# Patient Record
Sex: FEMALE | Race: WHITE | Employment: OTHER | URBAN - METROPOLITAN AREA
[De-identification: names, ages, dates, MRNs, and addresses within clinical notes are randomized per-mention and may not be internally consistent; named-entity substitution may affect disease eponyms.]

---

## 2017-02-28 ENCOUNTER — TELEPHONE (OUTPATIENT)
Dept: INTERNAL MEDICINE | Age: 62
End: 2017-02-28

## 2017-03-09 ENCOUNTER — TELEPHONE (OUTPATIENT)
Dept: INTERNAL MEDICINE | Age: 62
End: 2017-03-09

## 2017-03-09 DIAGNOSIS — G44.221 CHRONIC TENSION-TYPE HEADACHE, INTRACTABLE: Primary | ICD-10-CM

## 2017-03-31 ENCOUNTER — TELEPHONE (OUTPATIENT)
Dept: INTERNAL MEDICINE | Age: 62
End: 2017-03-31

## 2017-03-31 DIAGNOSIS — Z00.00 ROUTINE GENERAL MEDICAL EXAMINATION AT A HEALTH CARE FACILITY: Primary | ICD-10-CM

## 2017-03-31 DIAGNOSIS — G44.221 CHRONIC TENSION-TYPE HEADACHE, INTRACTABLE: ICD-10-CM

## 2017-04-06 ENCOUNTER — TELEPHONE (OUTPATIENT)
Dept: INTERNAL MEDICINE | Age: 62
End: 2017-04-06

## 2017-05-02 ENCOUNTER — OFFICE VISIT (OUTPATIENT)
Dept: INTERNAL MEDICINE | Age: 62
End: 2017-05-02

## 2017-05-02 VITALS
DIASTOLIC BLOOD PRESSURE: 66 MMHG | SYSTOLIC BLOOD PRESSURE: 106 MMHG | HEIGHT: 64 IN | BODY MASS INDEX: 20.49 KG/M2 | WEIGHT: 120 LBS

## 2017-05-02 DIAGNOSIS — M72.2 PLANTAR FASCIA SYNDROME: ICD-10-CM

## 2017-05-02 DIAGNOSIS — F51.01 PRIMARY INSOMNIA: ICD-10-CM

## 2017-05-02 DIAGNOSIS — F41.9 ANXIETY: ICD-10-CM

## 2017-05-02 DIAGNOSIS — K64.9 HEMORRHOIDS, UNSPECIFIED HEMORRHOID TYPE: ICD-10-CM

## 2017-05-02 PROCEDURE — 99214 OFFICE O/P EST MOD 30 MIN: CPT | Performed by: INTERNAL MEDICINE

## 2017-05-02 ASSESSMENT — ENCOUNTER SYMPTOMS
RHINORRHEA: 1
CHEST TIGHTNESS: 0
ABDOMINAL PAIN: 0
WHEEZING: 0
COLOR CHANGE: 0
BACK PAIN: 0

## 2017-05-04 PROBLEM — M72.2 PLANTAR FASCIA SYNDROME: Status: ACTIVE | Noted: 2017-05-04

## 2017-05-04 PROBLEM — K64.9 HEMORRHOIDS: Status: ACTIVE | Noted: 2017-05-04

## 2017-05-04 ASSESSMENT — ENCOUNTER SYMPTOMS
BLOOD IN STOOL: 1
RECTAL PAIN: 1

## 2017-05-26 ENCOUNTER — TELEPHONE (OUTPATIENT)
Dept: INTERNAL MEDICINE | Age: 62
End: 2017-05-26

## 2017-09-14 ENCOUNTER — HOSPITAL ENCOUNTER (OUTPATIENT)
Dept: OTHER | Age: 62
Discharge: HOME | End: 2017-09-14
Attending: INTERNAL MEDICINE | Admitting: INTERNAL MEDICINE

## 2017-09-14 ENCOUNTER — OFFICE VISIT (OUTPATIENT)
Dept: INTERNAL MEDICINE | Age: 62
End: 2017-09-14

## 2017-09-14 ENCOUNTER — TELEPHONE (OUTPATIENT)
Dept: INTERNAL MEDICINE | Age: 62
End: 2017-09-14

## 2017-09-14 VITALS
DIASTOLIC BLOOD PRESSURE: 66 MMHG | HEIGHT: 64 IN | BODY MASS INDEX: 21.17 KG/M2 | SYSTOLIC BLOOD PRESSURE: 106 MMHG | WEIGHT: 124 LBS

## 2017-09-14 DIAGNOSIS — Z00.00 WELL ADULT EXAM: ICD-10-CM

## 2017-09-14 DIAGNOSIS — H81.10 BPV (BENIGN POSITIONAL VERTIGO), UNSPECIFIED LATERALITY: ICD-10-CM

## 2017-09-14 DIAGNOSIS — R42 DIZZINESS: Primary | ICD-10-CM

## 2017-09-14 DIAGNOSIS — F41.9 ANXIETY: ICD-10-CM

## 2017-09-14 DIAGNOSIS — M54.2 NECK PAIN: ICD-10-CM

## 2017-09-14 DIAGNOSIS — G44.221 CHRONIC TENSION-TYPE HEADACHE, INTRACTABLE: ICD-10-CM

## 2017-09-14 DIAGNOSIS — F51.01 PRIMARY INSOMNIA: ICD-10-CM

## 2017-09-14 PROCEDURE — 99214 OFFICE O/P EST MOD 30 MIN: CPT | Performed by: INTERNAL MEDICINE

## 2017-09-14 ASSESSMENT — PATIENT HEALTH QUESTIONNAIRE - PHQ9
SUM OF ALL RESPONSES TO PHQ9 QUESTIONS 1 & 2: 0
SUM OF ALL RESPONSES TO PHQ QUESTIONS 1-9: 0
2. FEELING DOWN, DEPRESSED OR HOPELESS: 0
1. LITTLE INTEREST OR PLEASURE IN DOING THINGS: 0

## 2017-09-25 ASSESSMENT — ENCOUNTER SYMPTOMS
CHEST TIGHTNESS: 0
WHEEZING: 0
BACK PAIN: 0
ABDOMINAL PAIN: 0
COLOR CHANGE: 0

## 2017-12-13 ENCOUNTER — HOSPITAL ENCOUNTER (OUTPATIENT)
Dept: MAMMOGRAPHY | Age: 62
Discharge: HOME | End: 2017-12-13
Attending: OBSTETRICS & GYNECOLOGY | Admitting: OBSTETRICS & GYNECOLOGY

## 2017-12-13 DIAGNOSIS — Z12.31 VISIT FOR SCREENING MAMMOGRAM: ICD-10-CM

## 2018-01-11 ENCOUNTER — OFFICE VISIT (OUTPATIENT)
Dept: INTERNAL MEDICINE | Age: 63
End: 2018-01-11

## 2018-01-11 VITALS
DIASTOLIC BLOOD PRESSURE: 80 MMHG | WEIGHT: 125 LBS | BODY MASS INDEX: 21.34 KG/M2 | SYSTOLIC BLOOD PRESSURE: 116 MMHG | HEIGHT: 64 IN

## 2018-01-11 DIAGNOSIS — J01.41 ACUTE RECURRENT PANSINUSITIS: ICD-10-CM

## 2018-01-11 DIAGNOSIS — F41.9 ANXIETY: ICD-10-CM

## 2018-01-11 DIAGNOSIS — I34.1 MVP (MITRAL VALVE PROLAPSE): ICD-10-CM

## 2018-01-11 DIAGNOSIS — G44.221 CHRONIC TENSION-TYPE HEADACHE, INTRACTABLE: Primary | ICD-10-CM

## 2018-01-11 DIAGNOSIS — R00.2 PALPITATIONS: ICD-10-CM

## 2018-01-11 PROCEDURE — 99214 OFFICE O/P EST MOD 30 MIN: CPT | Performed by: INTERNAL MEDICINE

## 2018-01-11 RX ORDER — BUTALBITAL, ACETAMINOPHEN AND CAFFEINE 50; 325; 40 MG/1; MG/1; MG/1
1 TABLET ORAL EVERY 4 HOURS PRN
Qty: 30 TABLET | Refills: 1 | Status: SHIPPED | OUTPATIENT
Start: 2018-01-11 | End: 2019-07-08

## 2018-01-11 ASSESSMENT — ENCOUNTER SYMPTOMS
SINUS PRESSURE: 1
CHEST TIGHTNESS: 0
COUGH: 0
BACK PAIN: 0
COLOR CHANGE: 0
ABDOMINAL PAIN: 0
SINUS PAIN: 1
WHEEZING: 0
EYE PAIN: 0
EYE DISCHARGE: 0

## 2018-01-11 NOTE — PROGRESS NOTES
Subjective:      Patient ID: Priti Nayak is a 63 y.o. female. Having severe headache since [place] - on top of her head and now in gums- thought it was dental pain but dentist said not related- no fevers- no night sweats- concerned she is taking too much medication but rarely takes more than 1 tylenol- no neuro symptoms-does feel anxious but not using ativan at all- feels a great deal of pressure in the head-having palpitations at night-denies cp or sob-    Review of Systems   Constitutional: Positive for fatigue. Negative for activity change and appetite change. HENT: Positive for congestion, postnasal drip, sinus pain and sinus pressure. Eyes: Negative for pain, discharge and visual disturbance. Respiratory: Negative for cough, chest tightness and wheezing. Cardiovascular: Positive for palpitations. Negative for chest pain and leg swelling. Gastrointestinal: Negative for abdominal pain. Musculoskeletal: Negative for arthralgias and back pain. Skin: Negative for color change and rash. Neurological: Positive for headaches. Negative for tremors, seizures, syncope, speech difficulty, weakness, light-headedness and numbness. Psychiatric/Behavioral: The patient is nervous/anxious. Objective:   Physical Exam   Constitutional: She is oriented to person, place, and time. She appears well-developed and well-nourished. She appears distressed. HENT:   Head: Normocephalic and atraumatic. Right Ear: External ear normal.   Left Ear: External ear normal.   Mouth/Throat: Oropharyngeal exudate present. Eyes: Conjunctivae and EOM are normal. Pupils are equal, round, and reactive to light. Neck: Normal range of motion. Neck supple. Cardiovascular: Normal rate, regular rhythm and normal heart sounds. No carotid bruit auscultated bilaterally   Pulmonary/Chest: Effort normal and breath sounds normal. No respiratory distress. Abdominal: She exhibits no distension.  There is no

## 2018-01-25 ENCOUNTER — TELEPHONE (OUTPATIENT)
Dept: INTERNAL MEDICINE | Age: 63
End: 2018-01-25

## 2018-01-25 DIAGNOSIS — R51.9 SEVERE HEADACHE: Primary | ICD-10-CM

## 2018-01-25 DIAGNOSIS — G89.29 CHRONIC INTRACTABLE HEADACHE, UNSPECIFIED HEADACHE TYPE: ICD-10-CM

## 2018-01-25 DIAGNOSIS — R51.9 CHRONIC INTRACTABLE HEADACHE, UNSPECIFIED HEADACHE TYPE: ICD-10-CM

## 2018-01-25 NOTE — TELEPHONE ENCOUNTER
Pt calling about headaches. Pt tried to schedule with neurologist. Could not get in till end of April.  Pt is asking for an order for an MRI or help with earlier appt with Neurologist. Pt aware  is gone for the day asking to speak with Leonora Ruelas

## 2018-02-02 ENCOUNTER — TELEPHONE (OUTPATIENT)
Dept: INTERNAL MEDICINE | Age: 63
End: 2018-02-02

## 2018-02-09 NOTE — TELEPHONE ENCOUNTER
Ok to do without contrast but pt should be informed that by avoiding it it makes the scan [facility] less sensitive for picking up lesions- it will show some things but standard of care for r/o of tumors or lesions in brain is w and w/out contrast-also pt needs to know that most insurances will not cover plain MRI without contrast altho I cannot say what her insurance specifically will do but since it is not a very effective test w/out contrast they often will not pay for it

## 2018-02-12 ENCOUNTER — TELEPHONE (OUTPATIENT)
Dept: INTERNAL MEDICINE | Age: 63
End: 2018-02-12

## 2018-02-12 NOTE — TELEPHONE ENCOUNTER
Pt stated that is returning call  To Staci give information. Pt would not stated information.   Please call

## 2018-03-12 ENCOUNTER — OFFICE VISIT (OUTPATIENT)
Dept: INTERNAL MEDICINE | Age: 63
End: 2018-03-12

## 2018-03-12 VITALS
HEIGHT: 64 IN | SYSTOLIC BLOOD PRESSURE: 108 MMHG | BODY MASS INDEX: 20.66 KG/M2 | WEIGHT: 121 LBS | TEMPERATURE: 98.5 F | DIASTOLIC BLOOD PRESSURE: 68 MMHG

## 2018-03-12 DIAGNOSIS — J40 BRONCHITIS: ICD-10-CM

## 2018-03-12 DIAGNOSIS — H69.82 DYSFUNCTION OF LEFT EUSTACHIAN TUBE: ICD-10-CM

## 2018-03-12 PROCEDURE — 99213 OFFICE O/P EST LOW 20 MIN: CPT | Performed by: INTERNAL MEDICINE

## 2018-03-13 ENCOUNTER — TELEPHONE (OUTPATIENT)
Dept: INTERNAL MEDICINE | Age: 63
End: 2018-03-13

## 2018-03-13 PROBLEM — H69.92 DYSFUNCTION OF LEFT EUSTACHIAN TUBE: Status: ACTIVE | Noted: 2018-03-13

## 2018-03-13 PROBLEM — J40 BRONCHITIS: Status: ACTIVE | Noted: 2018-03-13

## 2018-03-13 PROBLEM — H69.82 DYSFUNCTION OF LEFT EUSTACHIAN TUBE: Status: ACTIVE | Noted: 2018-03-13

## 2018-03-13 RX ORDER — AMOXICILLIN 500 MG/1
500 CAPSULE ORAL 3 TIMES DAILY
Qty: 30 CAPSULE | Refills: 0
Start: 2018-03-13 | End: 2019-01-30 | Stop reason: SDUPTHER

## 2018-03-13 RX ORDER — FLUCONAZOLE 150 MG/1
TABLET ORAL
Qty: 2 TABLET | Refills: 3 | Status: SHIPPED | OUTPATIENT
Start: 2018-03-13 | End: 2019-07-08

## 2018-03-13 RX ORDER — FLUTICASONE FUROATE AND VILANTEROL 100; 25 UG/1; UG/1
1 POWDER RESPIRATORY (INHALATION) DAILY
Qty: 1 EACH | Refills: 0
Start: 2018-03-13 | End: 2019-07-08

## 2018-03-13 ASSESSMENT — ENCOUNTER SYMPTOMS
ABDOMINAL PAIN: 0
COUGH: 1
WHEEZING: 0

## 2018-03-13 NOTE — TELEPHONE ENCOUNTER
Pt is asking for rx for yeast infection. Pt is taking an antibiotic. Pt states in past when taking antibiotic she has gotten a yeast infection. Pt would like to have rx in case she develops one.  Pharm[facility] on file

## 2019-01-30 ENCOUNTER — OFFICE VISIT (OUTPATIENT)
Dept: INTERNAL MEDICINE CLINIC | Age: 64
End: 2019-01-30
Payer: COMMERCIAL

## 2019-01-30 VITALS
SYSTOLIC BLOOD PRESSURE: 118 MMHG | HEIGHT: 64 IN | DIASTOLIC BLOOD PRESSURE: 80 MMHG | BODY MASS INDEX: 21 KG/M2 | WEIGHT: 123 LBS

## 2019-01-30 DIAGNOSIS — Z11.59 ENCOUNTER FOR HEPATITIS C SCREENING TEST FOR LOW RISK PATIENT: ICD-10-CM

## 2019-01-30 DIAGNOSIS — F51.01 PRIMARY INSOMNIA: ICD-10-CM

## 2019-01-30 DIAGNOSIS — Z11.4 ENCOUNTER FOR SCREENING FOR HIV: ICD-10-CM

## 2019-01-30 DIAGNOSIS — H81.10 BENIGN PAROXYSMAL POSITIONAL VERTIGO, UNSPECIFIED LATERALITY: ICD-10-CM

## 2019-01-30 DIAGNOSIS — R42 DIZZINESS: ICD-10-CM

## 2019-01-30 DIAGNOSIS — J30.2 SEASONAL ALLERGIES: ICD-10-CM

## 2019-01-30 DIAGNOSIS — M85.80 OSTEOPENIA, UNSPECIFIED LOCATION: ICD-10-CM

## 2019-01-30 DIAGNOSIS — Z00.00 WELL ADULT EXAM: Primary | ICD-10-CM

## 2019-01-30 PROBLEM — J40 BRONCHITIS: Status: RESOLVED | Noted: 2018-03-13 | Resolved: 2019-01-30

## 2019-01-30 PROCEDURE — 99396 PREV VISIT EST AGE 40-64: CPT | Performed by: INTERNAL MEDICINE

## 2019-01-30 RX ORDER — MECLIZINE HYDROCHLORIDE 25 MG/1
25 TABLET ORAL 3 TIMES DAILY PRN
Qty: 30 TABLET | Refills: 5 | Status: SHIPPED | OUTPATIENT
Start: 2019-01-30 | End: 2019-02-09

## 2019-01-30 RX ORDER — AMOXICILLIN 500 MG/1
500 CAPSULE ORAL 3 TIMES DAILY
Qty: 30 CAPSULE | Refills: 0 | Status: SHIPPED | OUTPATIENT
Start: 2019-01-30 | End: 2019-02-09

## 2019-01-30 ASSESSMENT — ENCOUNTER SYMPTOMS
COLOR CHANGE: 0
BACK PAIN: 0
WHEEZING: 0
CHEST TIGHTNESS: 0
ABDOMINAL PAIN: 0

## 2019-01-30 ASSESSMENT — PATIENT HEALTH QUESTIONNAIRE - PHQ9
SUM OF ALL RESPONSES TO PHQ QUESTIONS 1-9: 0
SUM OF ALL RESPONSES TO PHQ9 QUESTIONS 1 & 2: 0
2. FEELING DOWN, DEPRESSED OR HOPELESS: 0
1. LITTLE INTEREST OR PLEASURE IN DOING THINGS: 0
SUM OF ALL RESPONSES TO PHQ QUESTIONS 1-9: 0

## 2019-02-25 ENCOUNTER — HOSPITAL ENCOUNTER (OUTPATIENT)
Dept: MAMMOGRAPHY | Age: 64
Discharge: HOME | End: 2019-03-02
Payer: COMMERCIAL

## 2019-02-25 DIAGNOSIS — Z12.31 VISIT FOR SCREENING MAMMOGRAM: ICD-10-CM

## 2019-02-25 PROCEDURE — 77067 SCR MAMMO BI INCL CAD: CPT

## 2019-02-28 ENCOUNTER — HOSPITAL ENCOUNTER (OUTPATIENT)
Dept: MAMMOGRAPHY | Age: 64
Discharge: HOME | End: 2019-02-28
Payer: COMMERCIAL

## 2019-02-28 DIAGNOSIS — R92.8 ABNORMAL MAMMOGRAM: ICD-10-CM

## 2019-02-28 PROCEDURE — G0279 TOMOSYNTHESIS, MAMMO: HCPCS

## 2019-05-17 ENCOUNTER — OFFICE VISIT (OUTPATIENT)
Dept: INTERNAL MEDICINE CLINIC | Age: 64
End: 2019-05-17
Payer: COMMERCIAL

## 2019-05-17 ENCOUNTER — TELEPHONE (OUTPATIENT)
Dept: INTERNAL MEDICINE CLINIC | Age: 64
End: 2019-05-17

## 2019-05-17 VITALS
WEIGHT: 121 LBS | OXYGEN SATURATION: 97 % | HEART RATE: 85 BPM | TEMPERATURE: 98.2 F | SYSTOLIC BLOOD PRESSURE: 112 MMHG | HEIGHT: 64 IN | BODY MASS INDEX: 20.66 KG/M2 | DIASTOLIC BLOOD PRESSURE: 78 MMHG

## 2019-05-17 DIAGNOSIS — J01.40 ACUTE NON-RECURRENT PANSINUSITIS: ICD-10-CM

## 2019-05-17 PROCEDURE — 99213 OFFICE O/P EST LOW 20 MIN: CPT | Performed by: NURSE PRACTITIONER

## 2019-05-17 RX ORDER — AMOXICILLIN 875 MG/1
875 TABLET, COATED ORAL 2 TIMES DAILY
Qty: 20 TABLET | Refills: 0 | Status: SHIPPED | OUTPATIENT
Start: 2019-05-17 | End: 2019-05-27

## 2019-05-17 ASSESSMENT — ENCOUNTER SYMPTOMS
WHEEZING: 0
RHINORRHEA: 0
SINUS PRESSURE: 1
SORE THROAT: 0
COUGH: 0
NAUSEA: 0
DIARRHEA: 0
VOMITING: 0
SHORTNESS OF BREATH: 0
SINUS PAIN: 1

## 2019-05-17 NOTE — PROGRESS NOTES
Subjective:      Patient ID: Ned aSlcido is a 66 y.o. female. Chief Complaint   Patient presents with    Congestion     Started Monday last week, green sputum     Fever     HPI  Jenni Wilkerson is here for sinus pain and pressure, nasal congestion, headache, and low grade fevers. Symptoms started about 10 days ago. She has been taking antihistamine, tylenol, and Flonase. She feels like symptoms are getting worse. Denies cough, SOB, and wheezing. Review of Systems   Constitutional: Positive for fatigue and fever. Negative for chills. HENT: Positive for congestion, sinus pressure and sinus pain. Negative for ear pain, postnasal drip, rhinorrhea and sore throat. Respiratory: Negative for cough, shortness of breath and wheezing. Cardiovascular: Negative for chest pain and palpitations. Gastrointestinal: Negative for diarrhea, nausea and vomiting. Musculoskeletal: Negative for arthralgias and myalgias. Neurological: Positive for headaches. Negative for dizziness and light-headedness. Objective:   Physical Exam   Constitutional: She is oriented to person, place, and time. She appears well-developed and well-nourished. HENT:   Right Ear: Tympanic membrane normal.   Left Ear: Tympanic membrane normal.   Nose: Mucosal edema present. No rhinorrhea. Right sinus exhibits maxillary sinus tenderness and frontal sinus tenderness. Left sinus exhibits maxillary sinus tenderness and frontal sinus tenderness. Mouth/Throat: Oropharynx is clear and moist.   Cardiovascular: Normal rate, regular rhythm and normal heart sounds. Pulmonary/Chest: Effort normal and breath sounds normal. No respiratory distress. She has no wheezes. Neurological: She is alert and oriented to person, place, and time. Skin: Skin is warm and dry. Assessment:      See Problem List assessment and plan       Plan:       No problem-specific Assessment & Plan notes found for this encounter.               Patient engaged in

## 2019-05-17 NOTE — TELEPHONE ENCOUNTER
Better get her some doxycycline 100 mg#20 1 bid and tell her to be sure to use Flonase and maybe mucinex plain

## 2019-05-17 NOTE — TELEPHONE ENCOUNTER
Patient called stating she has a cold, green color mucus when she is blowing her nose, headache and sinus pressure. Pt  States this has been going of for about 10 days now.       Please call to advise

## 2019-06-27 ENCOUNTER — HOSPITAL ENCOUNTER (OUTPATIENT)
Dept: WOMENS IMAGING | Age: 64
Discharge: HOME | End: 2019-06-27
Payer: COMMERCIAL

## 2019-06-27 DIAGNOSIS — Z00.00 WELL ADULT EXAM: ICD-10-CM

## 2019-06-27 DIAGNOSIS — M85.80 OSTEOPENIA, UNSPECIFIED LOCATION: ICD-10-CM

## 2019-06-27 PROCEDURE — 77080 DXA BONE DENSITY AXIAL: CPT

## 2019-07-08 ENCOUNTER — OFFICE VISIT (OUTPATIENT)
Dept: INTERNAL MEDICINE CLINIC | Age: 64
End: 2019-07-08
Payer: COMMERCIAL

## 2019-07-08 ENCOUNTER — TELEPHONE (OUTPATIENT)
Dept: INTERNAL MEDICINE CLINIC | Age: 64
End: 2019-07-08

## 2019-07-08 VITALS
WEIGHT: 117.4 LBS | DIASTOLIC BLOOD PRESSURE: 70 MMHG | BODY MASS INDEX: 20.04 KG/M2 | SYSTOLIC BLOOD PRESSURE: 110 MMHG | HEIGHT: 64 IN

## 2019-07-08 DIAGNOSIS — R10.30 LOWER ABDOMINAL PAIN: Primary | ICD-10-CM

## 2019-07-08 DIAGNOSIS — N39.42 URINARY INCONTINENCE WITHOUT SENSORY AWARENESS: ICD-10-CM

## 2019-07-08 DIAGNOSIS — R19.7 DIARRHEA, UNSPECIFIED TYPE: ICD-10-CM

## 2019-07-08 DIAGNOSIS — F41.9 ANXIETY: ICD-10-CM

## 2019-07-08 DIAGNOSIS — R10.30 LOWER ABDOMINAL PAIN: ICD-10-CM

## 2019-07-08 DIAGNOSIS — R53.83 FATIGUE, UNSPECIFIED TYPE: ICD-10-CM

## 2019-07-08 PROBLEM — J01.40 ACUTE NON-RECURRENT PANSINUSITIS: Status: RESOLVED | Noted: 2019-05-17 | Resolved: 2019-07-08

## 2019-07-08 LAB
A/G RATIO: 2.3 (ref 1.1–2.2)
ALBUMIN SERPL-MCNC: 4.9 G/DL (ref 3.4–5)
ALP BLD-CCNC: 51 U/L (ref 40–129)
ALT SERPL-CCNC: 15 U/L (ref 10–40)
AMYLASE: 50 U/L (ref 25–115)
ANION GAP SERPL CALCULATED.3IONS-SCNC: 11 MMOL/L (ref 3–16)
AST SERPL-CCNC: 15 U/L (ref 15–37)
BASOPHILS ABSOLUTE: 0 K/UL (ref 0–0.2)
BASOPHILS RELATIVE PERCENT: 0.6 %
BILIRUB SERPL-MCNC: 0.6 MG/DL (ref 0–1)
BUN BLDV-MCNC: 14 MG/DL (ref 7–20)
CALCIUM SERPL-MCNC: 10.1 MG/DL (ref 8.3–10.6)
CHLORIDE BLD-SCNC: 104 MMOL/L (ref 99–110)
CO2: 27 MMOL/L (ref 21–32)
CREAT SERPL-MCNC: 0.6 MG/DL (ref 0.6–1.2)
EOSINOPHILS ABSOLUTE: 0.1 K/UL (ref 0–0.6)
EOSINOPHILS RELATIVE PERCENT: 1.2 %
GFR AFRICAN AMERICAN: >60
GFR NON-AFRICAN AMERICAN: >60
GLOBULIN: 2.1 G/DL
GLUCOSE BLD-MCNC: 105 MG/DL (ref 70–99)
HCT VFR BLD CALC: 40 % (ref 36–48)
HEMOGLOBIN: 13.4 G/DL (ref 12–16)
LIPASE: 29 U/L (ref 13–60)
LYMPHOCYTES ABSOLUTE: 1.7 K/UL (ref 1–5.1)
LYMPHOCYTES RELATIVE PERCENT: 28.9 %
MCH RBC QN AUTO: 30 PG (ref 26–34)
MCHC RBC AUTO-ENTMCNC: 33.4 G/DL (ref 31–36)
MCV RBC AUTO: 89.9 FL (ref 80–100)
MONOCYTES ABSOLUTE: 0.5 K/UL (ref 0–1.3)
MONOCYTES RELATIVE PERCENT: 8.6 %
NEUTROPHILS ABSOLUTE: 3.6 K/UL (ref 1.7–7.7)
NEUTROPHILS RELATIVE PERCENT: 60.7 %
PDW BLD-RTO: 13.6 % (ref 12.4–15.4)
PLATELET # BLD: 251 K/UL (ref 135–450)
PMV BLD AUTO: 9.7 FL (ref 5–10.5)
POTASSIUM SERPL-SCNC: 4.6 MMOL/L (ref 3.5–5.1)
RBC # BLD: 4.45 M/UL (ref 4–5.2)
SODIUM BLD-SCNC: 142 MMOL/L (ref 136–145)
TOTAL PROTEIN: 7 G/DL (ref 6.4–8.2)
WBC # BLD: 5.9 K/UL (ref 4–11)

## 2019-07-08 PROCEDURE — 99214 OFFICE O/P EST MOD 30 MIN: CPT | Performed by: INTERNAL MEDICINE

## 2019-07-08 ASSESSMENT — ENCOUNTER SYMPTOMS
COLOR CHANGE: 0
CHEST TIGHTNESS: 0
BACK PAIN: 0
ABDOMINAL DISTENTION: 1
ABDOMINAL PAIN: 1
WHEEZING: 0

## 2019-07-08 NOTE — TELEPHONE ENCOUNTER
kareem ORNELAS per PHI, informing patient CT of abdomen and pelvis w/o contrast has been scheduled for 07/09/19 at [facility].. CT is scheduled for 4:30pm but informed to arrive at 3:00pm. Patient informed nothing to eat or drink 4 hours prior to testing. Advised to return call to confirm message was received.

## 2019-07-09 ENCOUNTER — TELEPHONE (OUTPATIENT)
Dept: INTERNAL MEDICINE CLINIC | Age: 64
End: 2019-07-09

## 2019-07-11 ENCOUNTER — TELEPHONE (OUTPATIENT)
Dept: INTERNAL MEDICINE CLINIC | Age: 64
End: 2019-07-11

## 2019-07-11 ENCOUNTER — NURSE ONLY (OUTPATIENT)
Dept: INTERNAL MEDICINE CLINIC | Age: 64
End: 2019-07-11

## 2019-07-11 DIAGNOSIS — R35.0 URINE FREQUENCY: Primary | ICD-10-CM

## 2019-07-11 NOTE — TELEPHONE ENCOUNTER
If she is having symptoms of frequency and burning w urination we can treat but it may be best if she came and left a specimen tomorrow so we can do a REAL u/a and cx-so have her do that and then we will get her abx if necessary-tell her I recall she had a neg u/a at her gyn just recently -ask how long she has had symptoms if she is having any- I DONOT think her pain is related to a uti so we should go ahead w the ct at [facility]- she doesn't need to see me tomorrow if she doesn't need to -just drop off the specimen

## 2019-07-11 NOTE — TELEPHONE ENCOUNTER
Spoke with patient and and informed her to drop off specimen tomorrow so it can be sent of for UA/culture. Patient informed to continue with CT tomorrow at [facility] due to pain not being related to UTI. Patient stated that she can leave sample today. Patient informed we close at 5. Patient stated she did not want to wait another 24 hours due to symptoms getting worse.

## 2019-07-11 NOTE — TELEPHONE ENCOUNTER
Pt states she did a self test for a UTI with AZO strip test and it came back pink which shows on the kit is a sign of a UTI. Pt would like to discuss with Dr. Lemuel Ferraro.

## 2019-07-11 NOTE — TELEPHONE ENCOUNTER
Now pt stating she IS having frequency and dysuria and insists that amox has helped in past-explained that it is NOT abx of choice but did convince pt to let us do urine cx and she will take amox she has on hand

## 2019-07-13 LAB — URINE CULTURE, ROUTINE: NORMAL

## 2019-07-15 ENCOUNTER — TELEPHONE (OUTPATIENT)
Dept: INTERNAL MEDICINE CLINIC | Age: 64
End: 2019-07-15

## 2019-07-15 NOTE — TELEPHONE ENCOUNTER
Pt would like the results of CT ABDOMEN PELVIS WO and CT ABDOMEN PELVIS WO CONTRAST Additional Contrast.    Pt also would like the results of her urine culture

## 2020-04-21 ENCOUNTER — TELEPHONE (OUTPATIENT)
Dept: INTERNAL MEDICINE CLINIC | Age: 65
End: 2020-04-21

## 2020-04-21 NOTE — TELEPHONE ENCOUNTER
She is definitely overdue for a physical so she can either do it virtually or wait until things are opened up hopefully in the next 4 weeks or so-whichever she prefers-do labs first cbc,cmp,lipid,tsh and vit d

## 2020-05-14 ENCOUNTER — HOSPITAL ENCOUNTER (OUTPATIENT)
Dept: MAMMOGRAPHY | Age: 65
Discharge: HOME | End: 2020-05-14
Payer: COMMERCIAL

## 2020-05-14 PROCEDURE — 77067 SCR MAMMO BI INCL CAD: CPT

## 2020-06-04 ENCOUNTER — NURSE ONLY (OUTPATIENT)
Dept: INTERNAL MEDICINE CLINIC | Age: 65
End: 2020-06-04
Payer: COMMERCIAL

## 2020-06-04 VITALS — TEMPERATURE: 97.4 F

## 2020-06-04 PROCEDURE — 90471 IMMUNIZATION ADMIN: CPT | Performed by: INTERNAL MEDICINE

## 2020-06-04 PROCEDURE — 90715 TDAP VACCINE 7 YRS/> IM: CPT | Performed by: INTERNAL MEDICINE

## 2020-08-01 ENCOUNTER — TELEPHONE (OUTPATIENT)
Dept: INTERNAL MEDICINE CLINIC | Age: 65
End: 2020-08-01

## 2020-08-01 NOTE — TELEPHONE ENCOUNTER
Pt did labs at [facility]- all ok except cholesterol sl up- needs to see me for physical virtually if covered-last seen 7/2019

## 2020-08-10 ENCOUNTER — TELEPHONE (OUTPATIENT)
Dept: INTERNAL MEDICINE CLINIC | Age: 65
End: 2020-08-10

## 2020-08-10 NOTE — TELEPHONE ENCOUNTER
Pt called stating that she saw her labs in the chart, and then this morning, she was unable to see them any longer. She is asking that either Staci or Dr. Mehul Cardenas call and go over them with her. She saw that he cholesterol was high. Also, she received a notice that she needs another cologuard test is needed, it's been 3 yrs and she is requesting that as well.       Please advise

## 2020-08-10 NOTE — TELEPHONE ENCOUNTER
See my phone note for her- she needs an office call !!!a virtual physical if covered- we can discuss cholesterol then -hasn't been seen for more than 1 yr - I released her labs and they still say released so we can get her a printout if she'd like but no clue why she cant see them

## 2020-08-12 ENCOUNTER — VIRTUAL VISIT (OUTPATIENT)
Dept: INTERNAL MEDICINE CLINIC | Age: 65
End: 2020-08-12
Payer: COMMERCIAL

## 2020-08-12 PROBLEM — R19.7 DIARRHEA: Status: RESOLVED | Noted: 2019-07-08 | Resolved: 2020-08-12

## 2020-08-12 PROBLEM — I48.0 INTERMITTENT ATRIAL FIBRILLATION (HCC): Status: ACTIVE | Noted: 2020-08-12

## 2020-08-12 PROBLEM — R14.0 BLOATING: Status: ACTIVE | Noted: 2020-08-12

## 2020-08-12 PROBLEM — M72.2 PLANTAR FASCIA SYNDROME: Status: RESOLVED | Noted: 2017-05-04 | Resolved: 2020-08-12

## 2020-08-12 PROBLEM — M85.89 OSTEOPENIA OF MULTIPLE SITES: Status: ACTIVE | Noted: 2020-08-12

## 2020-08-12 PROCEDURE — 99214 OFFICE O/P EST MOD 30 MIN: CPT | Performed by: INTERNAL MEDICINE

## 2020-08-12 SDOH — ECONOMIC STABILITY: TRANSPORTATION INSECURITY
IN THE PAST 12 MONTHS, HAS THE LACK OF TRANSPORTATION KEPT YOU FROM MEDICAL APPOINTMENTS OR FROM GETTING MEDICATIONS?: NO

## 2020-08-12 SDOH — ECONOMIC STABILITY: FOOD INSECURITY: WITHIN THE PAST 12 MONTHS, THE FOOD YOU BOUGHT JUST DIDN'T LAST AND YOU DIDN'T HAVE MONEY TO GET MORE.: NEVER TRUE

## 2020-08-12 SDOH — ECONOMIC STABILITY: INCOME INSECURITY: HOW HARD IS IT FOR YOU TO PAY FOR THE VERY BASICS LIKE FOOD, HOUSING, MEDICAL CARE, AND HEATING?: NOT HARD AT ALL

## 2020-08-12 SDOH — ECONOMIC STABILITY: TRANSPORTATION INSECURITY
IN THE PAST 12 MONTHS, HAS LACK OF TRANSPORTATION KEPT YOU FROM MEETINGS, WORK, OR FROM GETTING THINGS NEEDED FOR DAILY LIVING?: NO

## 2020-08-12 SDOH — ECONOMIC STABILITY: FOOD INSECURITY: WITHIN THE PAST 12 MONTHS, YOU WORRIED THAT YOUR FOOD WOULD RUN OUT BEFORE YOU GOT MONEY TO BUY MORE.: NEVER TRUE

## 2020-08-12 ASSESSMENT — PATIENT HEALTH QUESTIONNAIRE - PHQ9
SUM OF ALL RESPONSES TO PHQ QUESTIONS 1-9: 0
SUM OF ALL RESPONSES TO PHQ QUESTIONS 1-9: 0
SUM OF ALL RESPONSES TO PHQ9 QUESTIONS 1 & 2: 0
2. FEELING DOWN, DEPRESSED OR HOPELESS: 0
1. LITTLE INTEREST OR PLEASURE IN DOING THINGS: 0

## 2020-08-12 ASSESSMENT — ENCOUNTER SYMPTOMS
BACK PAIN: 0
COLOR CHANGE: 0
CHEST TIGHTNESS: 0
ABDOMINAL PAIN: 0
WHEEZING: 0

## 2020-08-12 NOTE — PROGRESS NOTES
2020    TELEHEALTH EVALUATION -- Audio/Visual ([place])    HPI:    Wandy Calderón (:  1955) has requested an audio/video evaluation for the following concern(s): Was having some palpitations so had holter and had 2 episodes of afib- no treatment needed- much better after decreased caffeine- cholesterol is up slightly- otherwise feels well- headaches well controlled if she takes her allergy meds-sleeping better now -having some intermittent low back pain    Review of Systems   Constitutional: Negative for activity change, appetite change and fatigue. HENT: Negative for congestion. Eyes: Negative for visual disturbance. Respiratory: Negative for chest tightness and wheezing. Cardiovascular: Positive for palpitations. Negative for chest pain. Gastrointestinal: Negative for abdominal pain. Musculoskeletal: Negative for arthralgias and back pain. Skin: Negative for color change and rash. Neurological: Negative for weakness, light-headedness and headaches. Psychiatric/Behavioral: Negative for sleep disturbance. The patient is not nervous/anxious. Prior to Visit Medications    Not on File       Social History     Tobacco Use    Smoking status: Never Smoker    Smokeless tobacco: Never Used   Substance Use Topics    Alcohol use: Yes     Comment: 1 glass of wine daily    Drug use: No        Past Medical History:   Diagnosis Date    Allergic rhinitis     Anxiety     Breast mass 2012    Cystic- aspirated in radiology 3/2012- for repeat 6 months     Dermatitis 2014    On legs winter 2014     Hypoglycemia     LLQ abdominal pain 2011    W/ orgasm has pain- ?? etiology     Plantar fascia syndrome 2017       PHYSICAL EXAMINATION:  There were no vitals filed for this visit.      Constitutional: [x] Appears well-developed and well-nourished [x] No apparent distress      [] Abnormal-   Mental status  [x] Alert and awake  [x] evaluation of the following organ systems was limited: Vitals/Constitutional/EENT/Resp/CV/GI//MS/Neuro/Skin/Heme-Lymph-Imm. Pursuant to the emergency declaration under the [address] [place] [address], [address] [place] [address] and the [employer] Act, this Virtual Visit was conducted with patient's (and/or legal guardian's) consent, to reduce the patient's risk of exposure to COVID-19 and provide necessary medical care. The patient (and/or legal guardian) has also been advised to contact this office for worsening conditions or problems, and seek emergency medical treatment and/or call 911 if deemed necessary. Patient identification was verified at the start of the visit: yes    Total time spent on this encounter: 25 mins    Services were provided through a video synchronous discussion virtually to substitute for in-person clinic visit. Patient and provider were located at their individual homes. --Caesar Vidal MD on 8/12/2020 at 10:50 AM    An electronic signature was used to authenticate this note.

## 2020-08-12 NOTE — ASSESSMENT & PLAN NOTE
Discussed w pt need to do core strengthening,stretches for low back regularly- needs to work w physical therapy if unable to do exercises on their own

## 2020-09-16 ENCOUNTER — TELEPHONE (OUTPATIENT)
Dept: INTERNAL MEDICINE CLINIC | Age: 65
End: 2020-09-16

## 2020-09-16 DIAGNOSIS — Z12.12 SCREENING FOR COLORECTAL CANCER: Primary | ICD-10-CM

## 2020-09-16 DIAGNOSIS — Z12.11 SCREENING FOR COLORECTAL CANCER: Primary | ICD-10-CM

## 2021-06-30 ENCOUNTER — HOSPITAL ENCOUNTER (OUTPATIENT)
Dept: MAMMOGRAPHY | Age: 66
Discharge: HOME | End: 2021-06-30
Payer: COMMERCIAL

## 2021-06-30 VITALS — BODY MASS INDEX: 20.49 KG/M2 | WEIGHT: 120 LBS | HEIGHT: 64 IN

## 2021-06-30 DIAGNOSIS — Z12.31 VISIT FOR SCREENING MAMMOGRAM: ICD-10-CM

## 2021-06-30 PROCEDURE — 77063 BREAST TOMOSYNTHESIS BI: CPT

## 2021-08-17 ENCOUNTER — OFFICE VISIT (OUTPATIENT)
Dept: INTERNAL MEDICINE CLINIC | Age: 66
End: 2021-08-17
Payer: COMMERCIAL

## 2021-08-17 VITALS
HEIGHT: 64 IN | BODY MASS INDEX: 20.49 KG/M2 | DIASTOLIC BLOOD PRESSURE: 68 MMHG | SYSTOLIC BLOOD PRESSURE: 102 MMHG | TEMPERATURE: 97.3 F | WEIGHT: 120 LBS

## 2021-08-17 DIAGNOSIS — R53.83 FATIGUE, UNSPECIFIED TYPE: ICD-10-CM

## 2021-08-17 DIAGNOSIS — G44.221 CHRONIC TENSION-TYPE HEADACHE, INTRACTABLE: ICD-10-CM

## 2021-08-17 DIAGNOSIS — R14.0 BLOATING: ICD-10-CM

## 2021-08-17 DIAGNOSIS — I48.0 INTERMITTENT ATRIAL FIBRILLATION (HCC): ICD-10-CM

## 2021-08-17 DIAGNOSIS — F41.9 ANXIETY: ICD-10-CM

## 2021-08-17 DIAGNOSIS — R42 DIZZINESS: ICD-10-CM

## 2021-08-17 DIAGNOSIS — E78.00 PURE HYPERCHOLESTEROLEMIA: ICD-10-CM

## 2021-08-17 DIAGNOSIS — M85.89 OSTEOPENIA OF MULTIPLE SITES: ICD-10-CM

## 2021-08-17 DIAGNOSIS — M81.0 AGE-RELATED OSTEOPOROSIS WITHOUT CURRENT PATHOLOGICAL FRACTURE: Primary | ICD-10-CM

## 2021-08-17 DIAGNOSIS — N64.9 BREAST LESION: ICD-10-CM

## 2021-08-17 DIAGNOSIS — H69.81 DYSFUNCTION OF RIGHT EUSTACHIAN TUBE: ICD-10-CM

## 2021-08-17 DIAGNOSIS — F51.01 PRIMARY INSOMNIA: ICD-10-CM

## 2021-08-17 PROBLEM — H69.91 DYSFUNCTION OF RIGHT EUSTACHIAN TUBE: Status: ACTIVE | Noted: 2018-03-13

## 2021-08-17 PROCEDURE — 99396 PREV VISIT EST AGE 40-64: CPT | Performed by: INTERNAL MEDICINE

## 2021-08-17 SDOH — ECONOMIC STABILITY: FOOD INSECURITY: WITHIN THE PAST 12 MONTHS, THE FOOD YOU BOUGHT JUST DIDN'T LAST AND YOU DIDN'T HAVE MONEY TO GET MORE.: NEVER TRUE

## 2021-08-17 SDOH — ECONOMIC STABILITY: FOOD INSECURITY: WITHIN THE PAST 12 MONTHS, YOU WORRIED THAT YOUR FOOD WOULD RUN OUT BEFORE YOU GOT MONEY TO BUY MORE.: NEVER TRUE

## 2021-08-17 ASSESSMENT — PATIENT HEALTH QUESTIONNAIRE - PHQ9
SUM OF ALL RESPONSES TO PHQ9 QUESTIONS 1 & 2: 0
SUM OF ALL RESPONSES TO PHQ QUESTIONS 1-9: 0
SUM OF ALL RESPONSES TO PHQ QUESTIONS 1-9: 0
1. LITTLE INTEREST OR PLEASURE IN DOING THINGS: 0
2. FEELING DOWN, DEPRESSED OR HOPELESS: 0
SUM OF ALL RESPONSES TO PHQ QUESTIONS 1-9: 0

## 2021-08-17 ASSESSMENT — ENCOUNTER SYMPTOMS
COLOR CHANGE: 0
WHEEZING: 0
BACK PAIN: 0
ABDOMINAL PAIN: 0
CHEST TIGHTNESS: 0

## 2021-08-17 ASSESSMENT — SOCIAL DETERMINANTS OF HEALTH (SDOH): HOW HARD IS IT FOR YOU TO PAY FOR THE VERY BASICS LIKE FOOD, HOUSING, MEDICAL CARE, AND HEATING?: NOT HARD AT ALL

## 2021-08-17 NOTE — PROGRESS NOTES
Subjective:      Patient ID: Zafar Samson is a 61 y.o. female. Chief Complaint   Patient presents with    Annual Exam     yearly/ labs needed ,shingles   feels well- headaches are well controlled- feels they are allergy related-usually resolves w her otc allergy med- utd w cardiology- still hasn't had her shingix- has some neck pain off and on- has a left breast cyst that is not amenable to aspiration   Zafar Samson  1955    No Known Allergies  No current outpatient medications on file. No current facility-administered medications for this visit. Vitals:    08/17/21 1020   BP: 102/68   Temp: 97.3 °F (36.3 °C)   Weight: 120 lb (54.4 kg)   Height: 5' 4\" (1.626 m)     Body mass index is 20.6 kg/m².      Wt Readings from Last 3 Encounters:   08/17/21 120 lb (54.4 kg)   06/30/21 120 lb (54.4 kg)   07/08/19 117 lb 6.4 oz (53.3 kg)     BP Readings from Last 3 Encounters:   08/17/21 102/68   07/08/19 110/70   05/17/19 112/78         Immunization History   Administered Date(s) Administered    COVID-19, Moderna, PF, 100mcg/0.5mL 03/04/2021, 04/01/2021    Tdap (Boostrix, Adacel) 06/04/2020       Past Medical History:   Diagnosis Date    Allergic rhinitis     Anxiety     Breast mass 2/22/2012    Cystic- aspirated in radiology 3/2012- for repeat 6 months     Dermatitis 4/7/2014    On legs winter 2014     Hypoglycemia     LLQ abdominal pain 6/8/2011    W/ orgasm has pain- ?? etiology     Perimenopausal 7/12/2012    Dr. Carlotta Saldivar     Plantar fascia syndrome 5/4/2017    Primary insomnia 11/9/2015    Refusing to try gabapentin - advised her to see Dr. Myesha Adams      Past Surgical History:   Procedure Laterality Date    CHOLECYSTECTOMY      COLONOSCOPY      ordered    US BREAST FINE NEEDLE ASPIRATION      VARICOSE VEIN SURGERY       Family History   Problem Relation Age of Onset    Cancer Mother         Ovarian    Ovarian Cancer Mother     Cancer Father         Prostate     Social History Socioeconomic History    Marital status:      Spouse name: Not on file    Number of children: Not on file    Years of education: Not on file    Highest education level: Not on file   Occupational History    Not on file   Tobacco Use    Smoking status: Never Smoker    Smokeless tobacco: Never Used   Substance and Sexual Activity    Alcohol use: Yes     Comment: 1 glass of wine daily    Drug use: No    Sexual activity: Yes     Partners: Male     Comment: 2 children   Other Topics Concern    Not on file   Social History Narrative    Not on file     Social Determinants of Health     Financial Resource Strain: Low Risk     Difficulty of Paying Living Expenses: Not hard at all   Food Insecurity: No Food Insecurity    Worried About [address] in the Last Year: Never true    [address] in the Last Year: Never true   Transportation Needs:     Lack of Transportation (Medical):  Lack of Transportation (Non-Medical):    Physical Activity:     Days of Exercise per Week:     Minutes of Exercise per Session:    Stress:     Feeling of Stress :    Social Connections:     Frequency of Communication with Friends and Family:     Frequency of Social Gatherings with Friends and Family:     Attends Sikh Services:     Active Member of Clubs or Organizations:     Attends Club or Organization Meetings:     Marital Status:    Intimate Partner Violence:     Fear of Current or Ex-Partner:     Emotionally Abused:     Physically Abused:     Sexually Abused:              Review of Systems   Constitutional: Negative for activity change, appetite change and fatigue. HENT: Negative for congestion. Eyes: Negative for visual disturbance. Respiratory: Negative for chest tightness and wheezing. Cardiovascular: Negative for chest pain and palpitations. Gastrointestinal: Negative for abdominal pain. Musculoskeletal: Positive for neck pain and neck stiffness.  Negative for rare occurrences and dealing w it w/out further intervention    Breast lesion   F/u w Dr. Zeenat Zepeda next week          Complete physical completed. Patient now up to date with all routine screening including Pap and colonoscopy if needed, yearly eye and dental exams,labs, dexa if indicated. Counseled re: nutrition/calciumand vit d supplementation,seat belt use, no cell phone use with driving.

## 2021-08-17 NOTE — ASSESSMENT & PLAN NOTE
F/u w Dr. Jennifer Krishnan next week Progress Notes by Cathryn Lawrence MD at 2/9/2021 12:50 PM     Author: Cathryn Lawrence MD Service: -- Author Type: Physician    Filed: 2/9/2021  1:32 PM Encounter Date: 2/9/2021 Status: Signed    : Cathryn Lawrence MD (Physician)           Thank you, Dr. Boyd, for asking us to see Jayesh Courtney at the [facility].      Assessment/Recommendations   Assessment:    1.  Morbid obesity  2.  Likely untreated sleep apnea and recommend sleep study evaluation  3.  Nighttime Mobitz 1 block  4.  Hypertension  5.  Diabetes mellitus type 2    Plan:  1.  Continue on lisinopril 20 mg a day that was added by his primary.  May stop carvedilol due to evidence of Mobitz 1 block on his monitor.  2.  Continue on diabetic diet, weight loss and increased exercise  3.  Follow-up as needed       History of Present Illness    Mr. Jayesh Courtney is a 35 y.o. male with history of morbid obesity, hypertension who I saw in rapid access clinic due to palpitations and hypertension.  I started him on carvedilol.  Echocardiogram showed mild left ventricular hypertrophy with left ventricular ejection fraction 60% and no significant valvular disease.  His monitor did not show any tachycardia tachycardia but did show evidence of Mobitz 1 second-degree block occurring at night.  He denies any further episodes of palpitations.  He was noted to have an elevated A1c and followed up with his primary who advised diabetic diet.  He has been very careful with his diet and monitoring his blood sugar at home which has improved.  He has been eating more of a plant-based diet with less carbs.  No complaints of chest pain.       Physical Examination Review of Systems   Vitals:    02/09/21 1255   BP: 132/88   Pulse: 64   Resp: 14     Body mass index is 60.54 kg/m .  Wt Readings from Last 3 Encounters:   02/09/21 (!) 407 lb (184.6 kg)   02/03/21 (!) 412 lb (186.9 kg)   01/22/21 (!) 412 lb (186.9 kg)       General  Appearance:   alert, no apparent distress   HEENT:  no scleral icterus; the mucous membranes are pink and moist                                  Neck: No jvd   Chest: the spine is straight and the chest is symmetric   Lungs:   respirations unlabored   Cardiovascular:   regular rhythm    Abdomen:  no organomegaly, masses, bruits, or tenderness; bowel sounds are present   Extremities: no edema   Skin: no xanthelasma    General: Weight Loss  Eyes: WNL  Ears/Nose/Throat: WNL  Lungs: WNL  Heart: WNL  Stomach: WNL  Bladder: WNL  Muscle/Joints: WNL  Skin: WNL  Nervous System: WNL  Mental Health: WNL     Blood: WNL     Medical History  Surgical History Family History Social History     Morbid obesity  Hypertension No past surgical history on file. Family History   Problem Relation Age of Onset   ? Diabetes Mother    ? Heart attack Father         Stents placed    Social History     Socioeconomic History   ? Marital status: Single     Spouse name: Not on file   ? Number of children: Not on file   ? Years of education: Not on file   ? Highest education level: Not on file   Occupational History   ? Not on file   Social Needs   ? Financial resource strain: Not on file   ? Food insecurity     Worry: Not on file     Inability: Not on file   ? Transportation needs     Medical: Not on file     Non-medical: Not on file   Tobacco Use   ? Smoking status: Never Smoker   ? Smokeless tobacco: Never Used   Substance and Sexual Activity   ? Alcohol use: No   ? Drug use: No   ? Sexual activity: Not on file   Lifestyle   ? Physical activity     Days per week: Not on file     Minutes per session: Not on file   ? Stress: Not on file   Relationships   ? Social connections     Talks on phone: Not on file     Gets together: Not on file     Attends Alevism service: Not on file     Active member of club or organization: Not on file     Attends meetings of clubs or organizations: Not on file     Relationship status: Not on file   ? Intimate partner  violence     Fear of current or ex partner: Not on file     Emotionally abused: Not on file     Physically abused: Not on file     Forced sexual activity: Not on file   Other Topics Concern   ? Not on file   Social History Narrative   ? Not on file          Medications  Allergies   Current Outpatient Medications   Medication Sig Dispense Refill   ? blood glucose meter (GLUCOMETER) Use 1 each As Directed as needed. Dispense glucometer brand per patient's insurance at pharmacy discretion. 1 each 0   ? blood glucose test (GLUCOSE BLOOD) strips Use 1 each As Directed 2 (two) times a day at 7:30am and 4:30pm. Dispense brand per patient's insurance at pharmacy discretion. 100 strip 11   ? generic lancets Use 1 each As Directed 2 (two) times a day at 7:30am and 4:30pm. Dispense brand per patient's insurance at pharmacy discretion. 120 each 11   ? lisinopriL (PRINIVIL,ZESTRIL) 20 MG tablet Take 1 tablet (20 mg total) by mouth daily. 90 tablet 3     No current facility-administered medications for this visit.       No Known Allergies      Lab Results    Chemistry/lipid CBC Cardiac Enzymes/BNP/TSH/INR   Lab Results   Component Value Date    CHOL 172 01/22/2021    HDL 39 (L) 01/22/2021    LDLCALC 118 01/22/2021    TRIG 77 01/22/2021    CREATININE 0.94 01/22/2021    BUN 11 01/22/2021    K 4.2 01/22/2021     01/22/2021     01/22/2021    CO2 25 01/22/2021    Lab Results   Component Value Date    WBC 9.1 01/08/2021    HGB 14.5 01/08/2021    HCT 45.7 01/08/2021    MCV 86 01/08/2021     01/08/2021    Lab Results   Component Value Date    TROPONINI 0.02 01/08/2021    BNP <10 01/12/2021    TSH 3.94 01/08/2021

## 2021-08-17 NOTE — PATIENT INSTRUCTIONS
Patient Education        Neck Arthritis: Exercises  Introduction  Here are some examples of exercises for you to try. The exercises may be suggested for a condition or for rehabilitation. Start each exercise slowly. Ease off the exercises if you start to have pain. You will be told when to start these exercises and which ones will work best for you. How to do the exercises  Neck stretches to the side   1. This stretch works best if you keep your shoulder down as you lean away from it. To help you remember to do this, start by relaxing your shoulders and lightly holding on to your thighs or your chair. 2. Tilt your head toward your shoulder and hold for 15 to 30 seconds. Let the weight of your head stretch your muscles. 3. Repeat 2 to 4 times toward each shoulder. Chin tuck   1. Lie on the floor with a rolled-up towel under your neck. Your head should be touching the floor. 2. Slowly bring your chin toward your chest.  3. Hold for a count of 6, and then relax for up to 10 seconds. 4. Repeat 8 to 12 times. Active cervical rotation   1. Sit in a firm chair, or stand up straight. 2. Keeping your chin level, turn your head to the right, and hold for 15 to 30 seconds. 3. Turn your head to the left and hold for 15 to 30 seconds. 4. Repeat 2 to 4 times to each side. Shoulder blade squeeze   1. While standing, squeeze your shoulder blades together. 2. Do not raise your shoulders up as you are squeezing. 3. Hold for 6 seconds. 4. Repeat 8 to 12 times. Shoulder rolls   1. Sit comfortably with your feet shoulder-width apart. You can also do this exercise standing up. 2. Roll your shoulders up, then back, and then down in a smooth, circular motion. 3. Repeat 2 to 4 times. Follow-up care is a key part of your treatment and safety. Be sure to make and go to all appointments, and call your doctor if you are having problems.  It's also a good idea to know your test results and keep a list of the medicines you take. Where can you learn more? Go to https://chpepiceweb.[email or web address] org and sign in to your [employer] account. Enter K500 in the [facility] box to learn more about \"Neck Arthritis: Exercises. \"     If you do not have an account, please click on the \"Sign Up Now\" link. Current as of: November 16, 2020               Content Version: 12.9  © 2006-2021 [employer]. Care instructions adapted under license by [facility] ([facility]). If you have questions about a medical condition or this instruction, always ask your healthcare professional. [place] [address] any warranty or liability for your use of this information. Patient Education        Neck Arthritis: Exercises  Introduction  Here are some examples of exercises for you to try. The exercises may be suggested for a condition or for rehabilitation. Start each exercise slowly. Ease off the exercises if you start to have pain. You will be told when to start these exercises and which ones will work best for you. How to do the exercises  Neck stretches to the side   4. This stretch works best if you keep your shoulder down as you lean away from it. To help you remember to do this, start by relaxing your shoulders and lightly holding on to your thighs or your chair. 5. Tilt your head toward your shoulder and hold for 15 to 30 seconds. Let the weight of your head stretch your muscles. 6. Repeat 2 to 4 times toward each shoulder. Chin tuck   5. Lie on the floor with a rolled-up towel under your neck. Your head should be touching the floor. 6. Slowly bring your chin toward your chest.  7. Hold for a count of 6, and then relax for up to 10 seconds. 8. Repeat 8 to 12 times. Active cervical rotation   5. Sit in a firm chair, or stand up straight. 6. Keeping your chin level, turn your head to the right, and hold for 15 to 30 seconds.   7. Turn your head to the left and hold for 15 to 30 seconds. 8. Repeat 2 to 4 times to each side. Shoulder blade squeeze   5. While standing, squeeze your shoulder blades together. 6. Do not raise your shoulders up as you are squeezing. 7. Hold for 6 seconds. 8. Repeat 8 to 12 times. Shoulder rolls   4. Sit comfortably with your feet shoulder-width apart. You can also do this exercise standing up. 5. Roll your shoulders up, then back, and then down in a smooth, circular motion. 6. Repeat 2 to 4 times. Follow-up care is a key part of your treatment and safety. Be sure to make and go to all appointments, and call your doctor if you are having problems. It's also a good idea to know your test results and keep a list of the medicines you take. Where can you learn more? Go to https://Coc[employer] Dogpe[employer]stecheb.[employer]. org and sign in to your [facility] account. Enter S045 in the [employer] box to learn more about \"Neck Arthritis: Exercises. \"     If you do not have an account, please click on the \"Sign Up Now\" link. Current as of: November 16, 2020               Content Version: 12.9  © 6492-5262 [employer]. Care instructions adapted under license by [facility] ([facility]). If you have questions about a medical condition or this instruction, always ask your healthcare professional. [address]  any warranty or liability for your use of this information.

## 2021-09-08 ENCOUNTER — TELEPHONE (OUTPATIENT)
Dept: INTERNAL MEDICINE CLINIC | Age: 66
End: 2021-09-08

## 2021-09-08 DIAGNOSIS — Z52.000 ENCOUNTER FOR DONATION OF WHOLE BLOOD: Primary | ICD-10-CM

## 2021-09-08 NOTE — TELEPHONE ENCOUNTER
Pt call back to [facility] scheduling is is confused on whether she needs to schedule for a Dexascan. Please advise. Please call to clear the confusion.

## 2021-10-21 ENCOUNTER — HOSPITAL ENCOUNTER (OUTPATIENT)
Dept: GENERAL RADIOLOGY | Age: 66
Discharge: HOME | End: 2021-10-21
Payer: COMMERCIAL

## 2021-10-21 DIAGNOSIS — M81.0 AGE-RELATED OSTEOPOROSIS WITHOUT CURRENT PATHOLOGICAL FRACTURE: ICD-10-CM

## 2021-10-21 PROCEDURE — 77080 DXA BONE DENSITY AXIAL: CPT

## 2021-11-23 ENCOUNTER — OFFICE VISIT (OUTPATIENT)
Dept: INTERNAL MEDICINE CLINIC | Age: 66
End: 2021-11-23
Payer: COMMERCIAL

## 2021-11-23 ENCOUNTER — NURSE TRIAGE (OUTPATIENT)
Dept: OTHER | Facility: CLINIC | Age: 66
End: 2021-11-23

## 2021-11-23 VITALS
BODY MASS INDEX: 20.59 KG/M2 | TEMPERATURE: 97.6 F | HEIGHT: 64 IN | SYSTOLIC BLOOD PRESSURE: 110 MMHG | OXYGEN SATURATION: 98 % | WEIGHT: 120.6 LBS | HEART RATE: 81 BPM | DIASTOLIC BLOOD PRESSURE: 62 MMHG

## 2021-11-23 DIAGNOSIS — G44.221 CHRONIC TENSION-TYPE HEADACHE, INTRACTABLE: ICD-10-CM

## 2021-11-23 DIAGNOSIS — J30.2 SEASONAL ALLERGIES: ICD-10-CM

## 2021-11-23 DIAGNOSIS — M81.0 AGE-RELATED OSTEOPOROSIS WITHOUT CURRENT PATHOLOGICAL FRACTURE: ICD-10-CM

## 2021-11-23 DIAGNOSIS — R00.2 PALPITATIONS: ICD-10-CM

## 2021-11-23 DIAGNOSIS — I48.0 INTERMITTENT ATRIAL FIBRILLATION (HCC): ICD-10-CM

## 2021-11-23 DIAGNOSIS — R05.9 COUGH: Primary | ICD-10-CM

## 2021-11-23 DIAGNOSIS — J02.9 SORE THROAT: ICD-10-CM

## 2021-11-23 PROBLEM — M85.89 OSTEOPENIA OF MULTIPLE SITES: Status: RESOLVED | Noted: 2020-08-12 | Resolved: 2021-11-23

## 2021-11-23 PROCEDURE — 99214 OFFICE O/P EST MOD 30 MIN: CPT | Performed by: INTERNAL MEDICINE

## 2021-11-23 RX ORDER — PROMETHAZINE HYDROCHLORIDE AND CODEINE PHOSPHATE 6.25; 1 MG/5ML; MG/5ML
5 SYRUP ORAL EVERY 4 HOURS PRN
Qty: 240 ML | Refills: 1 | Status: SHIPPED | OUTPATIENT
Start: 2021-11-23 | End: 2022-07-07 | Stop reason: ALTCHOICE

## 2021-11-23 ASSESSMENT — ENCOUNTER SYMPTOMS
CHEST TIGHTNESS: 0
SORE THROAT: 1
WHEEZING: 0
BACK PAIN: 0
ABDOMINAL PAIN: 0
COLOR CHANGE: 0
SINUS PRESSURE: 1

## 2021-11-23 ASSESSMENT — PATIENT HEALTH QUESTIONNAIRE - PHQ9
SUM OF ALL RESPONSES TO PHQ QUESTIONS 1-9: 0
SUM OF ALL RESPONSES TO PHQ QUESTIONS 1-9: 0
1. LITTLE INTEREST OR PLEASURE IN DOING THINGS: 0
SUM OF ALL RESPONSES TO PHQ QUESTIONS 1-9: 0
SUM OF ALL RESPONSES TO PHQ9 QUESTIONS 1 & 2: 0
2. FEELING DOWN, DEPRESSED OR HOPELESS: 0

## 2021-11-23 NOTE — PATIENT INSTRUCTIONS
Get humidifier for bedroom- stop allegra and get zyrtec and take before bed- keep gargling- mucinex dm    ENT's    Dr. Louisa Simmons, Dr. Charis Maldonado or partners  [phone] or [phone]    Dr. Edmondson, Dr. Fer Malin and   Damon Elizabeth [phone]  [facility]      See endocrinology  Dr. Marilynn Leyva  and Dr. Juan J Rich [address] [place] [address]. Dr. Schmitz [facility] [phone] opt#1    Dr. Anabel Chisholm [address]  [address] [facility] [address] [facility] [phone] opt#2    [address] [place] [address]  [phone] 5-Fu Counseling: 5-Fluorouracil Counseling:  I discussed with the patient the risks of 5-fluorouracil including but not limited to erythema, scaling, itching, weeping, crusting, and pain.

## 2021-11-23 NOTE — PROGRESS NOTES
Subjective:      Patient ID: Beverly Gonsales is a 61 y.o. female. Chief Complaint   Patient presents with    Pharyngitis     c/o sore throat, and nasal drip x9days        Sore throat past 9 days- had another covid test and had pcr both negative- now still w drainage- coughing a lot at night- non productive-no fever- cannot tolerate any decongestants due to palpitations but no recent episodes of afib- also never got message re: need to see endo and has OP- still having headaches off and on- allergies still bothering her and taking allegra regularly   Review of Systems   Constitutional: Negative for activity change, appetite change, fatigue and fever. HENT: Positive for congestion, sinus pressure and sore throat. Eyes: Negative for visual disturbance. Respiratory: Negative for chest tightness and wheezing. Cardiovascular: Negative for chest pain and palpitations. Gastrointestinal: Negative for abdominal pain. Musculoskeletal: Negative for arthralgias and back pain. Skin: Negative for color change and rash. Neurological: Negative for weakness, light-headedness and headaches.        Family History   Problem Relation Age of Onset    Cancer Mother         Ovarian    Ovarian Cancer Mother     Cancer Father         Prostate     Social History     Socioeconomic History    Marital status:      Spouse name: Not on file    Number of children: Not on file    Years of education: Not on file    Highest education level: Not on file   Occupational History    Not on file   Tobacco Use    Smoking status: Never Smoker    Smokeless tobacco: Never Used   Substance and Sexual Activity    Alcohol use: Yes     Comment: 1 glass of wine daily    Drug use: No    Sexual activity: Yes     Partners: Male     Comment: 2 children   Other Topics Concern    Not on file   Social History Narrative    Not on file     Social Determinants of Health     Financial Resource Strain: Low Risk     Difficulty of Paying Living Expenses: Not hard at all   Food Insecurity: No Food Insecurity    Worried About Running Out of Food in the Last Year: Never true    Ran Out of Food in the Last Year: Never true   Transportation Needs:     Lack of Transportation (Medical): Not on file    Lack of Transportation (Non-Medical): Not on file   Physical Activity:     Days of Exercise per Week: Not on file    Minutes of Exercise per Session: Not on file   Stress:     Feeling of Stress : Not on file   Social Connections:     Frequency of Communication with Friends and Family: Not on file    Frequency of Social Gatherings with Friends and Family: Not on file    Attends Jehovah's witness Services: Not on file    Active Member of [address] [place] [address] [employer] or Organizations: Not on file    Attends Club or Organization Meetings: Not on file    Marital Status: Not on file   Intimate Partner Violence:     Fear of Current or Ex-Partner: Not on file    Emotionally Abused: Not on file    Physically Abused: Not on file    Sexually Abused: Not on file   Housing Stability:     Unable to Pay for Housing in the Last Year: Not on file    Number of [place] in the Last Year: Not on file    Unstable Housing in the Last Year: Not on file         Objective:   Physical Exam  Constitutional:       Appearance: She is well-developed. HENT:      Head: Normocephalic and atraumatic. Mouth/Throat:      Mouth: Mucous membranes are moist.      Pharynx: Posterior oropharyngeal erythema present. Comments: Sl erythema of post pharynx without exudate   Eyes:      Conjunctiva/sclera: Conjunctivae normal.      Pupils: Pupils are equal, round, and reactive to light. Cardiovascular:      Rate and Rhythm: Normal rate and regular rhythm. Heart sounds: Normal heart sounds. Pulmonary:      Effort: Pulmonary effort is normal. No respiratory distress. Breath sounds: Normal breath sounds. Abdominal:      General: There is no distension. Tenderness:  There is no abdominal tenderness. Musculoskeletal:      Cervical back: Normal range of motion and neck supple. Lymphadenopathy:      Cervical: No cervical adenopathy. Skin:     General: Skin is warm and dry. Neurological:      Mental Status: She is alert and oriented to person, place, and time. Psychiatric:         Behavior: Behavior normal.           Assessment and Plan:      Sore throat   Use flonase-avoid afrin- get humidifier-see ent if persists     Palpitations   Cont to avoid pseudofed and caffeine     Age-related osteoporosis without current pathological fracture   Needs to see endo -info given     Seasonal allergies   Change to zyrtec     Intermittent atrial fibrillation (HCC)   F/u w cardiology only as needed     Chronic tension-type headache, intractable   Cont allergy meds and flonase        Encounter Diagnoses   Name Primary?  Cough Yes    Sore throat     Palpitations     Age-related osteoporosis without current pathological fracture     Seasonal allergies     Intermittent atrial fibrillation (HCC)     Chronic tension-type headache, intractable        No orders of the defined types were placed in this encounter.

## 2021-11-23 NOTE — TELEPHONE ENCOUNTER
Received call from [facility] at [facility] with [employer] Complaint. Brief description of triage: sore throat for >1 week and has headache. Started with just cold symptoms and some laryngitis, not just has the remaining sore throat and headache. Had 2 covid tests done, rapid and pcr and both negative. No fever    Triage indicates for patient to PCP for rapid strep or UCC today    Care advice provided, patient verbalizes understanding; denies any other questions or concerns; instructed to call back for any new or worsening symptoms. Writer provided warm transfer to Shelby Potter at [facility] for appointment scheduling. Attention Provider: Thank you for allowing me to participate in the care of your patient. The patient was connected to triage in response to information provided to the [facility]/PSC. Please do not respond through this encounter as the response is not directed to a shared pool. Reason for Disposition   Sore throat with cough/cold symptoms present > 5 days    Answer Assessment - Initial Assessment Questions  1. ONSET: \"When did the throat start hurting? \" (Hours or days ago)       1 week ago    2. SEVERITY: \"How bad is the sore throat? \" (Scale 1-10; mild, moderate or severe)    - MILD (1-3):  doesn't interfere with eating or normal activities    - MODERATE (4-7): interferes with eating some solids and normal activities    - SEVERE (8-10):  excruciating pain, interferes with most normal activities    - SEVERE DYSPHAGIA: can't swallow liquids, drooling      Moderate    3. STREP EXPOSURE: \"Has there been any exposure to strep within the past week? \" If so, ask: \"What type of contact occurred? \"       Not any known exposure    4. VIRAL SYMPTOMS: Frankie Cowgoldie there any symptoms of a cold, such as a runny nose, cough, hoarse voice or red eyes? \"       Runny nose    5. FEVER: \"Do you have a fever? \" If so, ask: \"What is your temperature, how was it measured, and when did it start? \"

## 2021-11-26 RX ORDER — AMOXICILLIN 500 MG/1
500 CAPSULE ORAL 3 TIMES DAILY
Qty: 21 CAPSULE | Refills: 0 | Status: SHIPPED | OUTPATIENT
Start: 2021-11-26 | End: 2021-12-03

## 2021-12-23 PROBLEM — J02.9 SORE THROAT: Status: RESOLVED | Noted: 2021-11-23 | Resolved: 2021-12-23

## 2022-01-10 ENCOUNTER — TELEPHONE (OUTPATIENT)
Dept: INTERNAL MEDICINE CLINIC | Age: 67
End: 2022-01-10

## 2022-01-10 NOTE — TELEPHONE ENCOUNTER
Dr. Linda Ma  and Dr. Va Angeles [address] [place] [address].       Dr. Sarah Latham or Dr. Janak Montgomery a  [address] [phone] opt#1      [address] [facility]  [phone]

## 2022-01-10 NOTE — TELEPHONE ENCOUNTER
Patient called in asking for the Referrals that  had given her for Osteoporosis physicians     Please call to advise

## 2022-01-14 ENCOUNTER — TELEPHONE (OUTPATIENT)
Dept: INTERNAL MEDICINE CLINIC | Age: 67
End: 2022-01-14

## 2022-01-14 DIAGNOSIS — M81.0 AGE-RELATED OSTEOPOROSIS WITHOUT CURRENT PATHOLOGICAL FRACTURE: Primary | ICD-10-CM

## 2022-01-14 NOTE — TELEPHONE ENCOUNTER
Patient is calling in because she needs a referral faxed over to the doctor that she was referred to by Dr. Burnard Flake Dr. Francella Kocher    Ph.# [phone]    Please advise and call

## 2022-06-02 NOTE — ASSESSMENT & PLAN NOTE
?? Related to gi issues- U/A nl- will w/u further if persists <-- Click to add NO pertinent Past Medical History

## 2022-07-06 LAB — PAP SMEAR, EXTERNAL: NORMAL

## 2022-07-07 ENCOUNTER — OFFICE VISIT (OUTPATIENT)
Dept: ENDOCRINOLOGY | Age: 67
End: 2022-07-07
Payer: MEDICARE

## 2022-07-07 VITALS
RESPIRATION RATE: 14 BRPM | SYSTOLIC BLOOD PRESSURE: 118 MMHG | TEMPERATURE: 98 F | HEIGHT: 64 IN | OXYGEN SATURATION: 99 % | BODY MASS INDEX: 20.49 KG/M2 | WEIGHT: 120 LBS | HEART RATE: 85 BPM | DIASTOLIC BLOOD PRESSURE: 80 MMHG

## 2022-07-07 DIAGNOSIS — M81.0 AGE-RELATED OSTEOPOROSIS WITHOUT CURRENT PATHOLOGICAL FRACTURE: Primary | ICD-10-CM

## 2022-07-07 PROCEDURE — G8427 DOCREV CUR MEDS BY ELIG CLIN: HCPCS | Performed by: INTERNAL MEDICINE

## 2022-07-07 PROCEDURE — G8399 PT W/DXA RESULTS DOCUMENT: HCPCS | Performed by: INTERNAL MEDICINE

## 2022-07-07 PROCEDURE — 1036F TOBACCO NON-USER: CPT | Performed by: INTERNAL MEDICINE

## 2022-07-07 PROCEDURE — 3017F COLORECTAL CA SCREEN DOC REV: CPT | Performed by: INTERNAL MEDICINE

## 2022-07-07 PROCEDURE — 1090F PRES/ABSN URINE INCON ASSESS: CPT | Performed by: INTERNAL MEDICINE

## 2022-07-07 PROCEDURE — 1123F ACP DISCUSS/DSCN MKR DOCD: CPT | Performed by: INTERNAL MEDICINE

## 2022-07-07 PROCEDURE — 99204 OFFICE O/P NEW MOD 45 MIN: CPT | Performed by: INTERNAL MEDICINE

## 2022-07-07 PROCEDURE — G8420 CALC BMI NORM PARAMETERS: HCPCS | Performed by: INTERNAL MEDICINE

## 2022-07-07 NOTE — PROGRESS NOTES
SUBJECTIVE:  Janae Najjar is a 74 y.o. female who is being evaluated for osteoporosis. 1. Age-related osteoporosis without current pathological fracture  This started in 10/2021. Patient was diagnosed with Osteoporosis. The problem has been gradually worsening. Patient started medication in N/A. Currently patient is on: N/A. Misses  N/A doses a month. Current complaints: denies bone pain  Takes calcium with vitamin K and vitamin D 3 tablets daily  Has low back ain for a long time    History of obstructive symptoms: difficulty swallowing No, changes in voice/hoarseness No.  History of radiation to patient's neck: No  Resent iodine exposure: No  Family history includes no thyroid abnormalities. Family history of thyroid cancer: No    At 15years old patient broke tail bone. No other fractures. No RA  No smoking  Mother or father did not brake hip or spine  Grandmother had hip fracture  No long time steroid use  Exercises  Eats healthy    Stopped MVI  Eats cheese drinks milk, eats broccoli, almonds, yogurt    Menopause since 65 yo      HISTORY: Age-related osteoporosis without current pathological fracture.       COMPARISON: None       REGION ASSESSED:       L Spine (L1-L4):  BMD= 0.778 g/cm2, T-score= -2.4, Z-score= -0.7.        L Hip:  BMD= 0.668 g/cm2, T-score= -2.2, Z-score= -1.1.     L Femoral Neck:  BMD= 0.588 g/cm2, T-score= -2.3, Z-score= -0.9       R Hip:  BMD= 0.662 g/cm2, T-score= -2.3, Z-score= -1.1   R Femoral Neck:  BMD= 0.551 g/cm2, T-score= -2.7, Z-score= -1.2           FRAX REPORT (WHO 10 Year Fracture Risk Assesment):       FRAX score is not reported; osteoporosis. .           Impression       1.  The bone mineral density is osteoporotic.       Secondary causes for low bone mass should be considered in patients with osteopenia and osteoporosis.  Patients with clinical history or radiographic documented fragility fracture have presumed osteoporosis.  All treatments require clinical judgment.        WHO (World Health Organization) criteria for post-menopausal females and males over 50:        T-score = Standard deviation from young normal controls   Z-score = Standard deviation from age match controls       Normal = T-score more positive than -1   Osteopenia = T-score -1.1 to -2.4   Osteoporosis =T-score more negative than -2.5       NOF Recommendations:  The NOF recommends an adult under the age of 48 needs 1,000 mg of calcium and 400-800 IU of vitamin D daily.  Adults 50 and over need 1,200 mg calcium and 800-1,000 IU of vitamin D daily.  Effective therapies for the prevention of    osteoporosis include bisphosphonates.         FRAX Version 3.08 (10 year fracture Risk Assessment):  According to NOF and ISCD FRAX applies to untreated postmenopausal women and men ages 42-80 with a hip T-score between -1.1 and -2.4. FRAX is highly dependent on established risk factors.  Risk    factors not captured in FRAX model include: frailty, falls, vitamin D deficiency, increased bone turnover, interval significant decline in BMD.  Patients with a FRAX of greater than 3% in the hip and greater than 20% for major osteoporotic fracture may     benefit from medical therapy for osteoporosis.        Past Medical History:   Diagnosis Date    Allergic rhinitis     Anxiety     Breast mass 2/22/2012    Cystic- aspirated in radiology 3/2012- for repeat 6 months     Dermatitis 4/7/2014    On legs winter 2014     Hypoglycemia     LLQ abdominal pain 6/8/2011    W/ orgasm has pain- ?? etiology     Perimenopausal 7/12/2012    Dr. Vincent Coello     Plantar fascia syndrome 5/4/2017    Primary insomnia 11/9/2015    Refusing to try gabapentin - advised her to see Dr. Bre Knapp      Patient Active Problem List    Diagnosis Date Noted    Age-related osteoporosis without current pathological fracture 11/23/2021    Intermittent atrial fibrillation ([address] 08/12/2020    Bloating 08/12/2020    Urinary incontinence without sensory awareness 07/08/2019    Fatigue 07/08/2019    Dizziness 01/30/2019    Dysfunction of right eustachian tube 03/13/2018    Palpitations 01/11/2018    Hemorrhoids 05/04/2017    BPV (benign positional vertigo) 09/29/2016    Chronic tension-type headache, intractable 11/09/2015    Parent refuses immunizations 06/07/2013    MVP (mitral valve prolapse) 07/12/2012    Family history of ovarian cancer 02/22/2012    Breast lesion 02/22/2012    Low back pain 02/22/2012    Seasonal allergies 08/25/2011     Past Surgical History:   Procedure Laterality Date    CHOLECYSTECTOMY      COLONOSCOPY      ordered    US BREAST FINE NEEDLE ASPIRATION      VARICOSE VEIN SURGERY       Family History   Problem Relation Age of Onset    Cancer Mother         Ovarian    Ovarian Cancer Mother     Cancer Father         Prostate     Social History     Socioeconomic History    Marital status:      Spouse name: None    Number of children: None    Years of education: None    Highest education level: None   Occupational History    None   Tobacco Use    Smoking status: Never Smoker    Smokeless tobacco: Never Used   Substance and Sexual Activity    Alcohol use: Not Currently     Comment: 1 glass of wine daily    Drug use: No    Sexual activity: Yes     Partners: Male     Comment: 2 children   Other Topics Concern    None   Social History Narrative    None     Social Determinants of Health     Financial Resource Strain: Low Risk     Difficulty of Paying Living Expenses: Not hard at all   Food Insecurity: No Food Insecurity    Worried About Running Out of Food in the Last Year: Never true    Xander of Food in the Last Year: Never true   Transportation Needs:     Lack of Transportation (Medical): Not on file    Lack of Transportation (Non-Medical):  Not on file   Physical Activity:     Days of Exercise per Week: Not on file    Minutes of Exercise per Session: Not on file   Stress:     Feeling of Stress : Not on file   Social Connections:     Frequency of Communication with Friends and Family: Not on file    Frequency of Social Gatherings with Friends and Family: Not on file    Attends Alevism Services: Not on file    Active Member of Clubs or Organizations: Not on file    Attends Club or Organization Meetings: Not on file    Marital Status: Not on file   Intimate Partner Violence:     Fear of Current or Ex-Partner: Not on file    Emotionally Abused: Not on file    Physically Abused: Not on file    Sexually Abused: Not on file   Housing Stability:     Unable to Pay for Housing in the Last Year: Not on file    Number of [place] in the Last Year: Not on file    Unstable Housing in the Last Year: Not on file     No current outpatient medications on file. No current facility-administered medications for this visit.      No Known Allergies  Family Status   Relation Name Status    Mother          ovarian    Father          prostate to bone    Brother  Alive    Brother  Alive    MGM      MGF      PGM      PGF         Review of Systems:  Constitutional: no fatigue, no fever, no recent weight gain, no recent weight loss, no changes in appetite  Eyes: no eye pain, no change in vision, no eye redness, no eye irritation, no double vision  Ears, nose, throat: no nasal congestion, no sore throat, no earache, no decrease in hearing, no hoarseness, no dry mouth, no sinus problems, no difficulty swallowing, no neck lumps, no dental problems, no mouth sores, no ringing in ears  Pulmonary: no shortness of breath, no wheezing, no dyspnea on exertion, no cough  Cardiovascular: no chest pain, no lower extremity edema, no orthopnea, no intermittent leg claudication, has palpitations  Gastrointestinal: no abdominal pain, no nausea, no vomiting, no diarrhea, no constipation, no dysphagia, no heartburn, no bloating  Genitourinary: no dysuria, no urinary incontinence, no urinary hesitancy, no urinary frequency, no feelings of urinary urgency, no nocturia  Musculoskeletal: no joint swelling, no joint stiffness, no joint pain, no muscle cramps, no muscle pain, no bone pain, has back pain  Integument/Breast: no hair loss, no skin rashes, no skin lesions, no itching, has dry skin  Neurological: no numbness, no tingling, no weakness, no confusion, no headaches, no dizziness, no fainting, no tremors, no decrease in memory, no balance problems  Psychiatric: no anxiety, no depression, no insomnia  Hematologic/Lymphatic: no tendency for easy bleeding, no swollen lymph nodes, no tendency for easy bruising  Immunology: has seasonal allergies, no frequent infections, no frequent illnesses  Endocrine: has temperature intolerance    /80   Pulse 85   Temp 98 °F (36.7 °C)   Resp 14   Ht 5' 4\" (1.626 m)   Wt 120 lb (54.4 kg)   SpO2 99%   BMI 20.60 kg/m²    Wt Readings from Last 3 Encounters:   07/07/22 120 lb (54.4 kg)   11/23/21 120 lb 9.6 oz (54.7 kg)   08/17/21 120 lb (54.4 kg)     Body mass index is 20.6 kg/m².     OBJECTIVE:  Constitutional: no acute distress, well appearing and well nourished  Psychiatric: oriented to person, place and time, judgement and insight and normal, recent and remote memory and intact and mood and affect are normal  Skin: skin and subcutaneous tissue is normal without mass, normal turgor  Head and Face: examination of head and face revealed no abnormalities  Eyes: no lid or conjunctival swelling, erythema or discharge, pupils are normal, equal, round, reactive to light  Ears/Nose: external inspection of ears and nose revealed no abnormalities, hearing is grossly normal  Oropharynx/Mouth/Face: lips, tongue and gums are normal with no lesions, the voice quality was normal  Neck: neck is supple and symmetric, with midline trachea and no masses, thyroid is normal  Lymphatics: normal cervical lymph nodes, normal supraclavicular nodes  Pulmonary: no increased work of breathing or signs of respiratory distress, lungs are clear to auscultation  Cardiovascular: normal heart rate and rhythm, normal S1 and S2, no murmurs and pedal pulses and 2+ bilaterally, No edema  Abdomen: abdomen is soft, non-tender with no masses  Musculoskeletal: normal gait and station and exam of the digits and nails are normal  Neurological: normal coordination and normal general cortical function      Lab Review:    Lab Results   Component Value Date/Time    WBC 5.9 09/07/2021 08:10 AM    HGB 12.8 09/07/2021 08:10 AM    HCT 38.9 09/07/2021 08:10 AM    MCV 89 09/07/2021 08:10 AM     09/07/2021 08:10 AM     Lab Results   Component Value Date/Time     09/07/2021 08:10 AM    K 4.0 09/07/2021 08:10 AM     09/07/2021 08:10 AM    CO2 24 09/07/2021 08:10 AM    BUN 13 09/07/2021 08:10 AM    CREATININE 0.76 09/07/2021 08:10 AM    GLUCOSE 93 09/07/2021 08:10 AM    CALCIUM 9.4 09/07/2021 08:10 AM    PROT 6.2 09/07/2021 08:10 AM    PROT 6.3 06/20/2012 08:48 AM    LABALBU 4.3 09/07/2021 08:10 AM    BILITOT 0.6 09/07/2021 08:10 AM    ALKPHOS 54 09/07/2021 08:10 AM    AST 15 09/07/2021 08:10 AM    ALT 12 09/07/2021 08:10 AM    LABGLOM 83 09/07/2021 08:10 AM    LABGLOM 98 06/20/2012 08:48 AM    GFRAA 96 09/07/2021 08:10 AM    GFRAA >60 06/14/2011 08:23 AM    AGRATIO 2.3 09/07/2021 08:10 AM    GLOB 1.9 09/07/2021 08:10 AM     Lab Results   Component Value Date/Time    TSH 1.870 09/07/2021 08:10 AM     No results found for: LABA1C  No results found for: EAG  Lab Results   Component Value Date/Time    CHOL 195 09/07/2021 08:10 AM     Lab Results   Component Value Date/Time    TRIG 108 09/07/2021 08:10 AM     Lab Results   Component Value Date/Time    HDL 63 09/07/2021 08:10 AM    HDL 70 06/14/2011 08:23 AM     Lab Results   Component Value Date/Time    LDLCALC 113 09/07/2021 08:10 AM     Lab Results   Component Value Date/Time    LABVLDL 19 09/07/2021 08:10 AM     Lab Results   Component Value Date/Time    CHOLHDLRATIO 2.8 06/20/2012 08:48 AM     No results found for: Lucille lSade  Lab Results   Component Value Date/Time    VITD25 43.2 07/28/2020 08:15 AM        ASSESSMENT/PLAN:  1. Age-related osteoporosis without current pathological fracture  Counseled patient about osteoporosis diagnosis, treatment options, medications, complications if untreated, fracture risk, medication side effects, benefits, efficacy, drug holiday therapy, secondary causes of osteoporosis, work-up, calcium intake calculator, calcium recommendations. - Cortisol, Urine, Free; Future  - Calcium, 24 HR Urine; Future  - Creatinine Clearance, Urine, 24 HR; Future  - Sodium, urine, 24 hour; Future  - Vitamin D 25 Hydroxy; Future  - PTH, Intact; Future  - Phosphorus; Future  - Electrophoresis Protein, Serum; Future  - Immunofixation serum profile; Future  - Comprehensive Metabolic Panel; Future  has some digestive problems    Reviewed and/or ordered clinical lab results Yes  Reviewed and/or ordered radiology tests Yes   Reviewed and/or ordered other diagnostic tests No  Discussed test results with performing physician No  Independently reviewed image, tracing, or specimen No  Made a decision to obtain old records No  Reviewed and summarized old records Yes   TSH 1.87    Calcium 9.4  25 hydroxy vitamin D43.2  DEXA 8/17/2021  Worst T score -2.7 her right femoral neck    Obtained history from other than patient No    Ellen Cornelius was counseled regarding symptoms of osteoporosis diagnosis, course and complications of disease if inadequately treated, side effects of medications, diagnosis, treatment options, and prognosis, risks, benefits, complications, and alternatives of treatment, labs, imaging and other studies and treatment targets and goals. She understands instructions and counseling.     Total time I spent for this encounter gathering history, performing physical exam, coordinating care, counseling, and documenting in the chart - 45 minutes    Return in about 2 months (around 9/7/2022) for osteoporosis.     Electronically signed by Jayme Gibson MD on 7/7/2022 at 10:51 PM

## 2022-07-27 ENCOUNTER — HOSPITAL ENCOUNTER (OUTPATIENT)
Dept: MAMMOGRAPHY | Age: 67
Discharge: HOME | End: 2022-07-27
Payer: MEDICARE

## 2022-07-27 VITALS — WEIGHT: 120 LBS | HEIGHT: 64 IN | BODY MASS INDEX: 20.49 KG/M2

## 2022-07-27 DIAGNOSIS — Z12.31 VISIT FOR SCREENING MAMMOGRAM: ICD-10-CM

## 2022-07-27 PROCEDURE — 77063 BREAST TOMOSYNTHESIS BI: CPT

## 2022-10-12 ENCOUNTER — OFFICE VISIT (OUTPATIENT)
Dept: PRIMARY CARE CLINIC | Age: 67
End: 2022-10-12

## 2022-10-12 VITALS
RESPIRATION RATE: 14 BRPM | TEMPERATURE: 97.6 F | DIASTOLIC BLOOD PRESSURE: 86 MMHG | BODY MASS INDEX: 20.14 KG/M2 | OXYGEN SATURATION: 99 % | HEART RATE: 90 BPM | HEIGHT: 64 IN | SYSTOLIC BLOOD PRESSURE: 120 MMHG | WEIGHT: 118 LBS

## 2022-10-12 DIAGNOSIS — H66.011 NON-RECURRENT ACUTE SUPPURATIVE OTITIS MEDIA OF RIGHT EAR WITH SPONTANEOUS RUPTURE OF TYMPANIC MEMBRANE: ICD-10-CM

## 2022-10-12 DIAGNOSIS — Z00.00 ENCOUNTER FOR ANNUAL PHYSICAL EXAM: Primary | ICD-10-CM

## 2022-10-12 PROBLEM — N39.42 URINARY INCONTINENCE WITHOUT SENSORY AWARENESS: Status: RESOLVED | Noted: 2019-07-08 | Resolved: 2022-10-12

## 2022-10-12 PROBLEM — I48.0 INTERMITTENT ATRIAL FIBRILLATION (HCC): Status: RESOLVED | Noted: 2020-08-12 | Resolved: 2022-10-12

## 2022-10-12 PROBLEM — R00.2 PALPITATIONS: Status: RESOLVED | Noted: 2018-01-11 | Resolved: 2022-10-12

## 2022-10-12 RX ORDER — AZITHROMYCIN 250 MG/1
TABLET, FILM COATED ORAL
COMMUNITY
Start: 2022-10-09 | End: 2022-10-19

## 2022-10-12 RX ORDER — AMOXICILLIN AND CLAVULANATE POTASSIUM 875; 125 MG/1; MG/1
1 TABLET, FILM COATED ORAL 2 TIMES DAILY
Qty: 14 TABLET | Refills: 0 | Status: SHIPPED | OUTPATIENT
Start: 2022-10-12 | End: 2022-10-19

## 2022-10-12 SDOH — HEALTH STABILITY: PHYSICAL HEALTH: ON AVERAGE, HOW MANY MINUTES DO YOU ENGAGE IN EXERCISE AT THIS LEVEL?: 30 MIN

## 2022-10-12 SDOH — ECONOMIC STABILITY: FOOD INSECURITY: WITHIN THE PAST 12 MONTHS, THE FOOD YOU BOUGHT JUST DIDN'T LAST AND YOU DIDN'T HAVE MONEY TO GET MORE.: NEVER TRUE

## 2022-10-12 SDOH — HEALTH STABILITY: PHYSICAL HEALTH: ON AVERAGE, HOW MANY DAYS PER WEEK DO YOU ENGAGE IN MODERATE TO STRENUOUS EXERCISE (LIKE A BRISK WALK)?: 2 DAYS

## 2022-10-12 SDOH — ECONOMIC STABILITY: FOOD INSECURITY: WITHIN THE PAST 12 MONTHS, YOU WORRIED THAT YOUR FOOD WOULD RUN OUT BEFORE YOU GOT MONEY TO BUY MORE.: NEVER TRUE

## 2022-10-12 ASSESSMENT — SOCIAL DETERMINANTS OF HEALTH (SDOH)
WITHIN THE LAST YEAR, HAVE TO BEEN RAPED OR FORCED TO HAVE ANY KIND OF SEXUAL ACTIVITY BY YOUR PARTNER OR EX-PARTNER?: NO
HOW HARD IS IT FOR YOU TO PAY FOR THE VERY BASICS LIKE FOOD, HOUSING, MEDICAL CARE, AND HEATING?: NOT HARD AT ALL
WITHIN THE LAST YEAR, HAVE YOU BEEN AFRAID OF YOUR PARTNER OR EX-PARTNER?: NO
WITHIN THE LAST YEAR, HAVE YOU BEEN KICKED, HIT, SLAPPED, OR OTHERWISE PHYSICALLY HURT BY YOUR PARTNER OR EX-PARTNER?: NO
WITHIN THE LAST YEAR, HAVE YOU BEEN HUMILIATED OR EMOTIONALLY ABUSED IN OTHER WAYS BY YOUR PARTNER OR EX-PARTNER?: NO

## 2022-10-12 ASSESSMENT — LIFESTYLE VARIABLES
HOW MANY STANDARD DRINKS CONTAINING ALCOHOL DO YOU HAVE ON A TYPICAL DAY: 1 OR 2
HOW OFTEN DO YOU HAVE A DRINK CONTAINING ALCOHOL: MONTHLY OR LESS

## 2022-10-12 ASSESSMENT — PATIENT HEALTH QUESTIONNAIRE - PHQ9
SUM OF ALL RESPONSES TO PHQ QUESTIONS 1-9: 0
SUM OF ALL RESPONSES TO PHQ9 QUESTIONS 1 & 2: 0
1. LITTLE INTEREST OR PLEASURE IN DOING THINGS: 0
2. FEELING DOWN, DEPRESSED OR HOPELESS: 0
SUM OF ALL RESPONSES TO PHQ QUESTIONS 1-9: 0

## 2022-10-12 NOTE — PROGRESS NOTES
10/12/2022    Nano Salvador (:  1955) is a 74 y.o. female, here for   Chief Complaint   Patient presents with    Establish Care    Sinus Problem     Nasal,headache       Patient Active Problem List   Diagnosis    Seasonal allergies    Family history of ovarian cancer    Breast lesion    Low back pain    MVP (mitral valve prolapse)    Parent refuses immunizations    Chronic tension-type headache, intractable    BPV (benign positional vertigo)    Hemorrhoids    Dysfunction of right eustachian tube    Dizziness    Fatigue    Bloating    Age-related osteoporosis without current pathological fracture     Started having sinus congestion, neg at home covid test, went to urgent care, took amoxicillin x 7 days. Right ear \"popped\" and will get some drainage at night, yellowish color    Went to another clinic last  and saw   Was given cipro ear drops, feels as if it is not getting better. Review of Systems   All other systems reviewed and are negative. Prior to Visit Medications    Medication Sig Taking?  Authorizing Provider   amoxicillin-clavulanate (AUGMENTIN) 875-125 MG per tablet Take 1 tablet by mouth 2 times daily for 7 days Yes Wilmar Zamora MD   azithromycin (ZITHROMAX) 250 MG tablet   Historical Provider, MD        No Known Allergies    Past Medical History:   Diagnosis Date    Allergic rhinitis     Anxiety     Breast mass 2012    Cystic- aspirated in radiology 3/2012- for repeat 6 months     Dermatitis 2014    On legs winter 2014     Hypoglycemia     LLQ abdominal pain 2011    W/ orgasm has pain- ?? etiology     Perimenopausal 2012    Dr. Catrina Johnson     Plantar fascia syndrome 2017    Primary insomnia 2015    Refusing to try gabapentin - advised her to see Dr. Meghna Mc        Past Surgical History:   Procedure Laterality Date    CHOLECYSTECTOMY      COLONOSCOPY      ordered    [address] History     Socioeconomic History    Marital status:      Spouse name: Not on file    Number of children: Not on file    Years of education: Not on file    Highest education level: Not on file   Occupational History    Not on file   Tobacco Use    Smoking status: Never    Smokeless tobacco: Never   Substance and Sexual Activity    Alcohol use: Not Currently     Comment: 1 glass of wine daily    Drug use: No    Sexual activity: Yes     Partners: Male     Comment: 2 children   Other Topics Concern    Not on file   Social History Narrative    Not on file     Social Determinants of Health     Financial Resource Strain: Low Risk     Difficulty of Paying Living Expenses: Not hard at all   Food Insecurity: No Food Insecurity    Worried About [address] in the Last Year: Never true    [address]  [place] in the Last Year: Never true   Transportation Needs: No Transportation Needs    Lack of Transportation (Medical): No    Lack of Transportation (Non-Medical): No   Physical Activity: Insufficiently Active    Days of Exercise per Week: 2 days    Minutes of Exercise per Session: 30 min   Stress: Not on file   Social Connections: Not on file   Intimate Partner Violence: Not At Risk    Fear of Current or Ex-Partner: No    Emotionally Abused: No    Physically Abused: No    Sexually Abused: No   Housing Stability: Not on file        Family History   Problem Relation Age of Onset    Cancer Mother         Ovarian    Ovarian Cancer Mother     Cancer Father         Prostate    Breast Cancer Neg Hx        ADVANCE DIRECTIVE: N, <no information>    Vitals:    10/12/22 1302   BP: 120/86   Site: Left Upper Arm   Position: Sitting   Cuff Size: Medium Adult   Pulse: 90   Resp: 14   Temp: 97.6 °F (36.4 °C)   SpO2: 99%   Weight: 118 lb (53.5 kg)   Height: 5' 4\" (1.626 m)     Estimated body mass index is 20.25 kg/m² as calculated from the following:    Height as of this encounter: 5' 4\" (1.626 m). Weight as of this encounter: 118 lb (53.5 kg).     Physical Exam  Vitals reviewed. Constitutional:       General: She is not in acute distress. Appearance: Normal appearance. She is not ill-appearing. HENT:      Head: Normocephalic and atraumatic. Right Ear: Ear canal and external ear normal.      Left Ear: Tympanic membrane, ear canal and external ear normal.      Ears:      Comments: No obvious perforation  Erythematous absent light reflex, jens colored fluid effusion       Nose: Nose normal. No rhinorrhea. Mouth/Throat:      Mouth: Mucous membranes are moist.      Pharynx: Oropharynx is clear. No oropharyngeal exudate. Eyes:      General: No scleral icterus. Right eye: No discharge. Left eye: No discharge. Extraocular Movements: Extraocular movements intact. Conjunctiva/sclera: Conjunctivae normal.   Cardiovascular:      Rate and Rhythm: Normal rate and regular rhythm. Pulses: Normal pulses. Heart sounds: Normal heart sounds. No murmur heard. No gallop. Pulmonary:      Effort: Pulmonary effort is normal.      Breath sounds: Normal breath sounds. No wheezing, rhonchi or rales. Abdominal:      General: Abdomen is flat. Bowel sounds are normal. There is no distension. Palpations: Abdomen is soft. There is no mass. Musculoskeletal:         General: Normal range of motion. Cervical back: Neck supple. No muscular tenderness. Lymphadenopathy:      Cervical: No cervical adenopathy. Skin:     General: Skin is warm. Capillary Refill: Capillary refill takes less than 2 seconds. Findings: No rash. Neurological:      General: No focal deficit present. Mental Status: She is alert. Cranial Nerves: No cranial nerve deficit. Psychiatric:         Mood and Affect: Mood normal.         Behavior: Behavior normal.       No flowsheet data found.     Lab Results   Component Value Date/Time    CHOL 195 09/07/2021 08:10 AM    CHOL 224 07/28/2020 08:15 AM    CHOL 209 02/19/2019 08:17 AM    TRIG 108 09/07/2021 08:10 AM    TRIG 114 07/28/2020 08:15 AM    TRIG 105 02/19/2019 08:17 AM    HDL 63 09/07/2021 08:10 AM    HDL 66 07/28/2020 08:15 AM    HDL 70 02/19/2019 08:17 AM    HDL 70 06/14/2011 08:23 AM    LDLCALC 113 09/07/2021 08:10 AM    LDLCALC 135 07/28/2020 08:15 AM    LDLCALC 118 02/19/2019 08:17 AM    GLUCOSE 93 09/07/2021 08:10 AM       The 10-year ASCVD risk score (Gurpreet CORREIA, et al., 2019) is: 4.6%    Values used to calculate the score:      Age: 74 years      Sex: Female      Is Non- : No      Diabetic: No      Tobacco smoker: No      Systolic Blood Pressure: 074 mmHg      Is BP treated: No      HDL Cholesterol: 63 mg/dL      Total Cholesterol: 195 mg/dL    Immunization History   Administered Date(s) Administered    COVID-19, MODERNA BLUE border, Primary or Immunocompromised, (age 12y+), IM, 100 mcg/0.5mL 03/04/2021, 04/01/2021    Tdap (Boostrix, Adacel) 06/04/2020       Health Maintenance   Topic Date Due    Shingles vaccine (1 of 2) Never done    COVID-19 Vaccine (3 - Booster for Moderna series) 09/01/2021    Pneumococcal 65+ years Vaccine (1 - PCV) Never done    Annual Wellness Visit (AWV)  Never done    Flu vaccine (1) 08/01/2022    Depression Screen  11/23/2022    Colorectal Cancer Screen  09/09/2023    Breast cancer screen  07/27/2024    Cervical cancer screen  07/06/2025    Lipids  09/07/2026    DTaP/Tdap/Td vaccine (2 - Td or Tdap) 06/04/2030    DEXA (modify frequency per FRAX score)  Completed    Hepatitis C screen  Completed    HIV screen  Completed    Hepatitis A vaccine  Aged Out    Hib vaccine  Aged Out    Meningococcal (ACWY) vaccine  Aged Out       Assessment & Plan     Non-recurrent acute suppurative otitis media of right ear with spontaneous rupture of tympanic membrane    I did not notice any perforation today  Can stop Cipro eardrops as well as Z-Yohan  Will send in Augmentin  Check labs     HLD  - check fasting lipid    Return in about 1 year (around 10/12/2023), or if symptoms worsen or fail to improve.          --Jennifer Barton MD

## 2022-10-13 ENCOUNTER — TELEPHONE (OUTPATIENT)
Dept: PRIMARY CARE CLINIC | Age: 67
End: 2022-10-13

## 2022-10-13 NOTE — TELEPHONE ENCOUNTER
Pt called and says she is having issue with antibiotics. Pt states she is feeling weak, exhausted, nausea. Pls call pt to discuss.

## 2022-10-18 ENCOUNTER — TELEPHONE (OUTPATIENT)
Dept: PRIMARY CARE CLINIC | Age: 67
End: 2022-10-18

## 2022-10-19 ENCOUNTER — OFFICE VISIT (OUTPATIENT)
Dept: PRIMARY CARE CLINIC | Age: 67
End: 2022-10-19
Payer: MEDICARE

## 2022-10-19 VITALS
WEIGHT: 119.2 LBS | SYSTOLIC BLOOD PRESSURE: 116 MMHG | TEMPERATURE: 98.2 F | RESPIRATION RATE: 12 BRPM | OXYGEN SATURATION: 98 % | HEIGHT: 64 IN | HEART RATE: 87 BPM | BODY MASS INDEX: 20.35 KG/M2 | DIASTOLIC BLOOD PRESSURE: 74 MMHG

## 2022-10-19 DIAGNOSIS — J31.0 CHRONIC RHINITIS: ICD-10-CM

## 2022-10-19 DIAGNOSIS — H92.01 RIGHT EAR PAIN: Primary | ICD-10-CM

## 2022-10-19 PROCEDURE — G8484 FLU IMMUNIZE NO ADMIN: HCPCS | Performed by: STUDENT IN AN ORGANIZED HEALTH CARE EDUCATION/TRAINING PROGRAM

## 2022-10-19 PROCEDURE — 3017F COLORECTAL CA SCREEN DOC REV: CPT | Performed by: STUDENT IN AN ORGANIZED HEALTH CARE EDUCATION/TRAINING PROGRAM

## 2022-10-19 PROCEDURE — G8420 CALC BMI NORM PARAMETERS: HCPCS | Performed by: STUDENT IN AN ORGANIZED HEALTH CARE EDUCATION/TRAINING PROGRAM

## 2022-10-19 PROCEDURE — 99213 OFFICE O/P EST LOW 20 MIN: CPT | Performed by: STUDENT IN AN ORGANIZED HEALTH CARE EDUCATION/TRAINING PROGRAM

## 2022-10-19 PROCEDURE — 1123F ACP DISCUSS/DSCN MKR DOCD: CPT | Performed by: STUDENT IN AN ORGANIZED HEALTH CARE EDUCATION/TRAINING PROGRAM

## 2022-10-19 PROCEDURE — G8399 PT W/DXA RESULTS DOCUMENT: HCPCS | Performed by: STUDENT IN AN ORGANIZED HEALTH CARE EDUCATION/TRAINING PROGRAM

## 2022-10-19 PROCEDURE — 1036F TOBACCO NON-USER: CPT | Performed by: STUDENT IN AN ORGANIZED HEALTH CARE EDUCATION/TRAINING PROGRAM

## 2022-10-19 PROCEDURE — G8427 DOCREV CUR MEDS BY ELIG CLIN: HCPCS | Performed by: STUDENT IN AN ORGANIZED HEALTH CARE EDUCATION/TRAINING PROGRAM

## 2022-10-19 PROCEDURE — 1090F PRES/ABSN URINE INCON ASSESS: CPT | Performed by: STUDENT IN AN ORGANIZED HEALTH CARE EDUCATION/TRAINING PROGRAM

## 2022-10-19 RX ORDER — FLUTICASONE PROPIONATE 50 MCG
1 SPRAY, SUSPENSION (ML) NASAL DAILY
COMMUNITY

## 2022-10-19 RX ORDER — FEXOFENADINE HCL 180 MG/1
180 TABLET ORAL DAILY
COMMUNITY

## 2022-10-19 RX ORDER — AZELASTINE 1 MG/ML
1 SPRAY, METERED NASAL 2 TIMES DAILY
Qty: 60 ML | Refills: 1 | Status: SHIPPED | OUTPATIENT
Start: 2022-10-19

## 2022-10-21 LAB
A/G RATIO: 1.8 (ref 1.2–2.2)
ALBUMIN SERPL-MCNC: 4.4 G/DL (ref 3.8–4.8)
ALP BLD-CCNC: 66 IU/L (ref 44–121)
ALT SERPL-CCNC: 16 IU/L (ref 0–32)
AST SERPL-CCNC: 18 IU/L (ref 0–40)
BASOPHILS ABSOLUTE: 0 X10E3/UL (ref 0–0.2)
BASOPHILS RELATIVE PERCENT: 0 %
BILIRUB SERPL-MCNC: 0.5 MG/DL (ref 0–1.2)
BUN / CREAT RATIO: 22 (ref 12–28)
BUN BLDV-MCNC: 14 MG/DL (ref 8–27)
CALCIUM SERPL-MCNC: 9.6 MG/DL (ref 8.7–10.3)
CHLORIDE BLD-SCNC: 103 MMOL/L (ref 96–106)
CHOLESTEROL, TOTAL: 202 MG/DL (ref 100–199)
CO2: 26 MMOL/L (ref 20–29)
COMMENT: ABNORMAL
CREAT SERPL-MCNC: 0.63 MG/DL (ref 0.57–1)
EOSINOPHILS ABSOLUTE: 0.1 X10E3/UL (ref 0–0.4)
EOSINOPHILS RELATIVE PERCENT: 2 %
ERYTHROCYTES, NUCLEATED/100 LEU: NORMAL
ESTIMATED GLOMERULAR FILTRATION RATE CREATININE EQUATION: 98 ML/MIN/1.73
GLOBULIN: 2.5 G/DL (ref 1.5–4.5)
GLUCOSE BLD-MCNC: 95 MG/DL (ref 70–99)
HCT VFR BLD CALC: 38.1 % (ref 34–46.6)
HDLC SERPL-MCNC: 59 MG/DL
HEMOGLOBIN: 12.6 G/DL (ref 11.1–15.9)
IMMATURE CELLS ABSOLUTE COUNT: NORMAL
IMMATURE GRANS (ABS): 0 X10E3/UL (ref 0–0.1)
IMMATURE GRANULOCYTES: 0 %
LDL CHOLESTEROL CALCULATED: 118 MG/DL (ref 0–99)
LYMPHOCYTES ABSOLUTE: 1.9 X10E3/UL (ref 0.7–3.1)
LYMPHOCYTES RELATIVE PERCENT: 33 %
MCH RBC QN AUTO: 29 PG (ref 26.6–33)
MCHC RBC AUTO-ENTMCNC: 33.1 G/DL (ref 31.5–35.7)
MCV RBC AUTO: 88 FL (ref 79–97)
MONOCYTES ABSOLUTE: 0.5 X10E3/UL (ref 0.1–0.9)
MONOCYTES RELATIVE PERCENT: 8 %
MORPHOLOGY: NORMAL
NEUTROPHILS ABSOLUTE: 3.3 X10E3/UL (ref 1.4–7)
PDW BLD-RTO: 12.5 % (ref 11.7–15.4)
PLATELET # BLD: 398 X10E3/UL (ref 150–450)
POTASSIUM SERPL-SCNC: 4.3 MMOL/L (ref 3.5–5.2)
RBC # BLD: 4.35 X10E6/UL (ref 3.77–5.28)
SEGMENTED NEUTROPHILS RELATIVE PERCENT: 57 %
SODIUM BLD-SCNC: 142 MMOL/L (ref 134–144)
TOTAL PROTEIN: 6.9 G/DL (ref 6–8.5)
TRIGL SERPL-MCNC: 145 MG/DL (ref 0–149)
VLDLC SERPL CALC-MCNC: 25 MG/DL (ref 5–40)
WBC # BLD: 5.9 X10E3/UL (ref 3.4–10.8)

## 2022-10-24 LAB
A/G RATIO: 1.2 (ref 0.7–1.7)
A/G RATIO: 1.8 (ref 1.2–2.2)
A/G RATIO: NORMAL
ABNORMAL PROTEIN BAND 1: NORMAL
ABNORMAL PROTEIN BAND 1: NORMAL G/DL
ALBUMIN ELP: 3.7 G/DL (ref 2.9–4.4)
ALBUMIN ELP: NORMAL
ALBUMIN SERPL-MCNC: 4.4 G/DL (ref 3.8–4.8)
ALP BLD-CCNC: 66 IU/L (ref 44–121)
ALPHA 2: 0.8 G/DL (ref 0.4–1)
ALPHA 2: NORMAL
ALPHA-1-GLOBULIN: 0.3 G/DL (ref 0–0.4)
ALPHA-1-GLOBULIN: NORMAL
ALT SERPL-CCNC: 15 IU/L (ref 0–32)
AST SERPL-CCNC: 18 IU/L (ref 0–40)
BETA GLOBULIN: 1 G/DL (ref 0.7–1.3)
BETA GLOBULIN: NORMAL
BILIRUB SERPL-MCNC: 0.5 MG/DL (ref 0–1.2)
BUN / CREAT RATIO: 22 (ref 12–28)
BUN BLDV-MCNC: 14 MG/DL (ref 8–27)
CALCIUM SERPL-MCNC: 9.5 MG/DL (ref 8.7–10.3)
CHLORIDE BLD-SCNC: 104 MMOL/L (ref 96–106)
CO2: 25 MMOL/L (ref 20–29)
COMMENT: NORMAL
COMMENT: NORMAL
CREAT SERPL-MCNC: 0.65 MG/DL (ref 0.57–1)
ESTIMATED GLOMERULAR FILTRATION RATE CREATININE EQUATION: 98 ML/MIN/1.73
GAMMA GLOBULIN: 1 G/DL (ref 0.4–1.8)
GAMMA GLOBULIN: NORMAL
GLOBULIN: 2.4 G/DL (ref 1.5–4.5)
GLOBULIN: 3.1 G/DL (ref 2.2–3.9)
GLOBULIN: NORMAL
GLUCOSE BLD-MCNC: 95 MG/DL (ref 70–99)
IGG,SERUM: 920 MG/DL (ref 586–1602)
IGM,SERUM: 201 MG/DL (ref 26–217)
IMMUNOGLOBULIN A, SERUM: 162 MG/DL (ref 87–352)
INTERPRETATION: NORMAL
LABORATORY REPORT: NORMAL
PARATHYROID HORMONE INTACT: 17 PG/ML (ref 15–65)
PHOSPHORUS: 3.4 MG/DL (ref 3–4.3)
POTASSIUM SERPL-SCNC: 4.4 MMOL/L (ref 3.5–5.2)
SODIUM BLD-SCNC: 143 MMOL/L (ref 134–144)
TOTAL PROTEIN: 6.8 G/DL (ref 6–8.5)
VITAMIN D 25-HYDROXY: 41.8 NG/ML (ref 30–100)

## 2022-11-05 ENCOUNTER — TELEPHONE (OUTPATIENT)
Dept: ENDOCRINOLOGY | Age: 67
End: 2022-11-05

## 2022-11-06 NOTE — TELEPHONE ENCOUNTER
Please call lab to clarify why serum protein electrophoresis and serum protein immunofixation were canceled?

## 2022-11-07 NOTE — TELEPHONE ENCOUNTER
Please advise. [facility] 10/20/22 results in media have the serum immunofixation result as no monoclonality detected. Protein electrophoresis on last page w/ graph/fraction values.

## 2022-11-10 ENCOUNTER — TELEPHONE (OUTPATIENT)
Dept: PRIMARY CARE CLINIC | Age: 67
End: 2022-11-10

## 2022-11-10 NOTE — TELEPHONE ENCOUNTER
Pt had physical her lipid is not being covered as well as her cbc with diff they said the doctor coded it wrong       [phone]

## 2022-11-10 NOTE — PROGRESS NOTES
Geo Marmolejo (:  1955) is a 74 y.o. female,Established patient, here for evaluation of the following chief complaint(s):  Ear Fullness (Follow up/Right )         ASSESSMENT/PLAN:  1. Right ear pain  -     [facility] - Roscoe Juarez DO, Otolaryngology, [facility]  2. Chronic rhinitis  -     azelastine (ASTELIN) 0.1 % nasal spray; 1 spray by Nasal route 2 times daily Use in each nostril as directed, Disp-60 mL, R-1Normal      Return if symptoms worsen or fail to improve. Subjective   SUBJECTIVE/OBJECTIVE:  HPI    Still having pain in right ear despite antibiotics  Wants referral to ENT  Has some nasal drainage, using flonase    Review of Systems   All other systems reviewed and are negative. Objective   Physical Exam  Vitals reviewed. Constitutional:       General: She is not in acute distress. Appearance: Normal appearance. She is not ill-appearing. HENT:      Head: Normocephalic and atraumatic. Right Ear: Tympanic membrane, ear canal and external ear normal.      Left Ear: Tympanic membrane, ear canal and external ear normal.   Eyes:      General: No scleral icterus. Right eye: No discharge. Left eye: No discharge. Extraocular Movements: Extraocular movements intact. Conjunctiva/sclera: Conjunctivae normal.   Pulmonary:      Effort: Pulmonary effort is normal. No respiratory distress. Musculoskeletal:      Cervical back: Neck supple. Skin:     Coloration: Skin is not jaundiced. Findings: No lesion or rash. Neurological:      Mental Status: She is alert. Cranial Nerves: No cranial nerve deficit. Coordination: Coordination normal.   Psychiatric:         Mood and Affect: Mood normal.                An electronic signature was used to authenticate this note.     --Dewayne Del Real MD

## 2022-11-28 ENCOUNTER — TELEPHONE (OUTPATIENT)
Dept: PRIMARY CARE CLINIC | Age: 67
End: 2022-11-28

## 2022-11-28 NOTE — TELEPHONE ENCOUNTER
Patients insurance denied paying for the blood work due to it was coded wrong. It should have been coded as a preventive.   Please change the code and resubmit to [employer]        Thank you   TOMASZ for [employer]: [phone]

## 2022-12-02 NOTE — TELEPHONE ENCOUNTER
Pt is stating it was for preventative labs that the tests were ordered. Said she discussed establishing care with Dr Michelle Gonzalez since her provider retired and she was overdue for annual labs. Let me know what you would like to code and I can call [facility]. Pt is on medicare and has a supplemental insurance.

## 2022-12-07 NOTE — TELEPHONE ENCOUNTER
Please let pt know that medicare does not pay for \"preventative labs\"     Hyperlipidemia for lipid panel  Hyperlipidemia for CMP  Dizziness and tinnitus for CBC

## 2023-01-04 NOTE — TELEPHONE ENCOUNTER
Left message for pt to call back. Updated codes with [facility]. They will resubmit to medicare. Pt can ignore current bill.

## 2023-01-23 ENCOUNTER — OFFICE VISIT (OUTPATIENT)
Dept: ENDOCRINOLOGY | Age: 68
End: 2023-01-23
Payer: MEDICARE

## 2023-01-23 VITALS
WEIGHT: 118 LBS | HEART RATE: 78 BPM | HEIGHT: 64 IN | TEMPERATURE: 98 F | DIASTOLIC BLOOD PRESSURE: 64 MMHG | BODY MASS INDEX: 20.14 KG/M2 | RESPIRATION RATE: 14 BRPM | SYSTOLIC BLOOD PRESSURE: 118 MMHG | OXYGEN SATURATION: 98 %

## 2023-01-23 DIAGNOSIS — M81.0 AGE-RELATED OSTEOPOROSIS WITHOUT CURRENT PATHOLOGICAL FRACTURE: Primary | ICD-10-CM

## 2023-01-23 PROCEDURE — G8420 CALC BMI NORM PARAMETERS: HCPCS | Performed by: INTERNAL MEDICINE

## 2023-01-23 PROCEDURE — G8427 DOCREV CUR MEDS BY ELIG CLIN: HCPCS | Performed by: INTERNAL MEDICINE

## 2023-01-23 PROCEDURE — G8484 FLU IMMUNIZE NO ADMIN: HCPCS | Performed by: INTERNAL MEDICINE

## 2023-01-23 PROCEDURE — 1123F ACP DISCUSS/DSCN MKR DOCD: CPT | Performed by: INTERNAL MEDICINE

## 2023-01-23 PROCEDURE — 3017F COLORECTAL CA SCREEN DOC REV: CPT | Performed by: INTERNAL MEDICINE

## 2023-01-23 PROCEDURE — G8399 PT W/DXA RESULTS DOCUMENT: HCPCS | Performed by: INTERNAL MEDICINE

## 2023-01-23 PROCEDURE — 99214 OFFICE O/P EST MOD 30 MIN: CPT | Performed by: INTERNAL MEDICINE

## 2023-01-23 PROCEDURE — 1090F PRES/ABSN URINE INCON ASSESS: CPT | Performed by: INTERNAL MEDICINE

## 2023-01-23 PROCEDURE — 1036F TOBACCO NON-USER: CPT | Performed by: INTERNAL MEDICINE

## 2023-01-23 NOTE — PROGRESS NOTES
SUBJECTIVE:  James Byrnes is a 74 y.o. female who is being evaluated for osteoporosis. 1. Age-related osteoporosis without current pathological fracture  This started in 10/2021. Patient was diagnosed with Osteoporosis. The problem has been gradually worsening. Patient started medication in N/A. Currently patient is on: N/A. Misses  N/A doses a month. Current complaints: denies bone pain  Takes calcium with vitamin K and vitamin D 3 tablets daily  Has low back ain for a long time    History of obstructive symptoms: difficulty swallowing No, changes in voice/hoarseness No.  History of radiation to patient's neck: No  Resent iodine exposure: No  Family history includes no thyroid abnormalities. Family history of thyroid cancer: No    At 15years old patient broke tail bone. No other fractures. No RA  No smoking  Mother or father did not brake hip or spine  Grandmother had hip fracture  No long time steroid use  Exercises  Eats healthy    Stopped MVI  Eats cheese drinks milk, eats broccoli, almonds, yogurt    Menopause since 63 yo      HISTORY: Age-related osteoporosis without current pathological fracture. COMPARISON: None       REGION ASSESSED:       L Spine (L1-L4):  BMD= 0.778 g/cm2, T-score= -2.4, Z-score= -0.7. L Hip:  BMD= 0.668 g/cm2, T-score= -2.2, Z-score= -1.1. L Femoral Neck:  BMD= 0.588 g/cm2, T-score= -2.3, Z-score= -0.9       R Hip:  BMD= 0.662 g/cm2, T-score= -2.3, Z-score= -1.1   R Femoral Neck:  BMD= 0.551 g/cm2, T-score= -2.7, Z-score= -1.2           FRAX REPORT (WHO 10 Year Fracture Risk Assesment):       FRAX score is not reported; osteoporosis. .           Impression       1. The bone mineral density is osteoporotic. Secondary causes for low bone mass should be considered in patients with osteopenia and osteoporosis. Patients with clinical history or radiographic documented fragility fracture have presumed osteoporosis.   All treatments require clinical judgment. WHO (World Health Organization) criteria for post-menopausal females and males over 50:        T-score = Standard deviation from young normal controls   Z-score = Standard deviation from age match controls       Normal = T-score more positive than -1   Osteopenia = T-score -1.1 to -2.4   Osteoporosis =T-score more negative than -2.5       NOF Recommendations: The NOF recommends an adult under the age of 48 needs 1,000 mg of calcium and 400-800 IU of vitamin D daily. Adults 50 and over need 1,200 mg calcium and 800-1,000 IU of vitamin D daily. Effective therapies for the prevention of    osteoporosis include bisphosphonates. FRAX Version 3.08 (10 year fracture Risk Assessment): According to NOF and ISCD FRAX applies to untreated postmenopausal women and men ages 42-80 with a hip T-score between -1.1 and -2.4. FRAX is highly dependent on established risk factors. Risk    factors not captured in FRAX model include: frailty, falls, vitamin D deficiency, increased bone turnover, interval significant decline in BMD.  Patients with a FRAX of greater than 3% in the hip and greater than 20% for major osteoporotic fracture may     benefit from medical therapy for osteoporosis.        Past Medical History:   Diagnosis Date    Allergic rhinitis     Anxiety     Breast mass 2/22/2012    Cystic- aspirated in radiology 3/2012- for repeat 6 months     Dermatitis 4/7/2014    On legs winter 2014     Hypoglycemia     LLQ abdominal pain 6/8/2011    W/ orgasm has pain- ?? etiology     Perimenopausal 7/12/2012    Dr. Lon Martin     Plantar fascia syndrome 5/4/2017    Primary insomnia 11/9/2015    Refusing to try gabapentin - advised her to see Dr. Nick Jack      Patient Active Problem List    Diagnosis Date Noted    Age-related osteoporosis without current pathological fracture 11/23/2021    Bloating 08/12/2020    Fatigue 07/08/2019    Dizziness 01/30/2019    Dysfunction of right eustachian tube 03/13/2018 Hemorrhoids 05/04/2017    BPV (benign positional vertigo) 09/29/2016    Chronic tension-type headache, intractable 11/09/2015    Parent refuses immunizations 06/07/2013    MVP (mitral valve prolapse) 07/12/2012    Family history of ovarian cancer 02/22/2012    Breast lesion 02/22/2012    Low back pain 02/22/2012    Seasonal allergies 08/25/2011     Past Surgical History:   Procedure Laterality Date    CHOLECYSTECTOMY      COLONOSCOPY      ordered    VARICOSE VEIN SURGERY       Family History   Problem Relation Age of Onset    Cancer Mother         Ovarian    Ovarian Cancer Mother     Cancer Father         Prostate    Breast Cancer Neg Hx      Social History     Socioeconomic History    Marital status:      Spouse name: None    Number of children: None    Years of education: None    Highest education level: None   Tobacco Use    Smoking status: Never    Smokeless tobacco: Never   Substance and Sexual Activity    Alcohol use: Not Currently     Comment: 1 glass of wine daily    Drug use: No    Sexual activity: Yes     Partners: Male     Comment: 2 children     Social Determinants of Health     Financial Resource Strain: Low Risk     Difficulty of Paying Living Expenses: Not hard at all   Food Insecurity: No Food Insecurity    Worried About Running Out of Food in the Last Year: Never true    Ran Out of Food in the Last Year: Never true   Transportation Needs: No Transportation Needs    Lack of Transportation (Medical): No    Lack of Transportation (Non-Medical):  No   Physical Activity: Insufficiently Active    Days of Exercise per Week: 2 days    Minutes of Exercise per Session: 30 min   Intimate Partner Violence: Not At Risk    Fear of Current or Ex-Partner: No    Emotionally Abused: No    Physically Abused: No    Sexually Abused: No     Current Outpatient Medications   Medication Sig Dispense Refill    CALCIUM PO Take 2 capsules by mouth daily PLANT CALCIUM - CONTAINS D3/ K1/K2/CALCIUM, MAGNESIUM, STRONTIUM, SILICA, VANADIUM      fexofenadine (ALLEGRA) 180 MG tablet Take 180 mg by mouth daily      fluticasone (FLONASE) 50 MCG/ACT nasal spray 1 spray by Each Nostril route daily      azelastine (ASTELIN) 0.1 % nasal spray 1 spray by Nasal route 2 times daily Use in each nostril as directed 60 mL 1     No current facility-administered medications for this visit.      No Known Allergies  Family Status   Relation Name Status    Mother          ovarian    Father          prostate to bone    Brother  Alive    Brother  Alive    MGM      MGF      PGM      PGF      Neg Hx  (Not Specified)       Review of Systems:  Constitutional: no fatigue, no fever, no recent weight gain, no recent weight loss, no changes in appetite  Eyes: no eye pain, no change in vision, no eye redness, no eye irritation, no double vision  Ears, nose, throat: no nasal congestion, no sore throat, no earache, no decrease in hearing, no hoarseness, no dry mouth, no sinus problems, no difficulty swallowing, no neck lumps, no dental problems, no mouth sores, no ringing in ears  Pulmonary: no shortness of breath, no wheezing, no dyspnea on exertion, no cough  Cardiovascular: no chest pain, no lower extremity edema, no orthopnea, no intermittent leg claudication, has palpitations  Gastrointestinal: no abdominal pain, no nausea, no vomiting, no diarrhea, no constipation, no dysphagia, no heartburn, no bloating  Genitourinary: no dysuria, no urinary incontinence, no urinary hesitancy, no urinary frequency, no feelings of urinary urgency, no nocturia  Musculoskeletal: no joint swelling, no joint stiffness, no joint pain, no muscle cramps, no muscle pain, no bone pain, has back pain  Integument/Breast: no hair loss, no skin rashes, no skin lesions, no itching, has dry skin  Neurological: no numbness, no tingling, no weakness, no confusion, no headaches, no dizziness, no fainting, no tremors, no decrease in memory, no balance problems  Psychiatric: no anxiety, no depression, no insomnia  Hematologic/Lymphatic: no tendency for easy bleeding, no swollen lymph nodes, no tendency for easy bruising  Immunology: has seasonal allergies, no frequent infections, no frequent illnesses  Endocrine: has temperature intolerance    /64   Pulse 78   Temp 98 °F (36.7 °C)   Resp 14   Ht 5' 4\" (1.626 m)   Wt 118 lb (53.5 kg)   LMP 01/01/2012   SpO2 98%   BMI 20.25 kg/m²    Wt Readings from Last 3 Encounters:   01/23/23 118 lb (53.5 kg)   10/19/22 119 lb 3.2 oz (54.1 kg)   10/12/22 118 lb (53.5 kg)     Body mass index is 20.25 kg/m².     OBJECTIVE:  Constitutional: no acute distress, well appearing and well nourished  Psychiatric: oriented to person, place and time, judgement and insight and normal, recent and remote memory and intact and mood and affect are normal  Skin: skin and subcutaneous tissue is normal without mass, normal turgor  Head and Face: examination of head and face revealed no abnormalities  Eyes: no lid or conjunctival swelling, erythema or discharge, pupils are normal, equal, round, reactive to light  Ears/Nose: external inspection of ears and nose revealed no abnormalities, hearing is grossly normal  Oropharynx/Mouth/Face: lips, tongue and gums are normal with no lesions, the voice quality was normal  Neck: neck is supple and symmetric, with midline trachea and no masses, thyroid is normal  Lymphatics: normal cervical lymph nodes, normal supraclavicular nodes  Pulmonary: no increased work of breathing or signs of respiratory distress, lungs are clear to auscultation  Cardiovascular: normal heart rate and rhythm, normal S1 and S2, no murmurs and pedal pulses and 2+ bilaterally, No edema  Abdomen: abdomen is soft, non-tender with no masses  Musculoskeletal: normal gait and station and exam of the digits and nails are normal  Neurological: normal coordination and normal general cortical function      Lab Review:    Lab Results   Component Value Date/Time    WBC 5.9 10/20/2022 09:01 AM    HGB 12.6 10/20/2022 09:01 AM    HCT 38.1 10/20/2022 09:01 AM    MCV 88 10/20/2022 09:01 AM     10/20/2022 09:01 AM     Lab Results   Component Value Date/Time     10/20/2022 09:02 AM    K 4.4 10/20/2022 09:02 AM     10/20/2022 09:02 AM    CO2 25 10/20/2022 09:02 AM    BUN 14 10/20/2022 09:02 AM    CREATININE 0.65 10/20/2022 09:02 AM    GLUCOSE 95 10/20/2022 09:02 AM    CALCIUM 9.5 10/20/2022 09:02 AM    PROT 6.8 10/20/2022 09:02 AM    PROT 6.3 06/20/2012 08:48 AM    LABALBU 4.4 10/20/2022 09:02 AM    BILITOT 0.5 10/20/2022 09:02 AM    ALKPHOS 66 10/20/2022 09:02 AM    AST 18 10/20/2022 09:02 AM    ALT 15 10/20/2022 09:02 AM    LABGLOM 83 09/07/2021 08:10 AM    LABGLOM 98 06/20/2012 08:48 AM    GFRAA 96 09/07/2021 08:10 AM    GFRAA >60 06/14/2011 08:23 AM    AGRATIO 1.8 10/20/2022 09:02 AM    AGRATIO 1.2 10/20/2022 09:02 AM    AGRATIO CANCELED 10/20/2022 09:02 AM    GLOB 2.4 10/20/2022 09:02 AM    GLOB 3.1 10/20/2022 09:02 AM    GLOB CANCELED 10/20/2022 09:02 AM     Lab Results   Component Value Date/Time    TSH 1.870 09/07/2021 08:10 AM     No results found for: LABA1C  No results found for: EAG  Lab Results   Component Value Date/Time    CHOL 202 10/20/2022 09:01 AM     Lab Results   Component Value Date/Time    TRIG 145 10/20/2022 09:01 AM     Lab Results   Component Value Date/Time    HDL 59 10/20/2022 09:01 AM    HDL 70 06/14/2011 08:23 AM     Lab Results   Component Value Date/Time    LDLCALC 118 10/20/2022 09:01 AM     Lab Results   Component Value Date/Time    LABVLDL 25 10/20/2022 09:01 AM     Lab Results   Component Value Date/Time    CHOLHDLRATIO 2.8 06/20/2012 08:48 AM     No results found for: LABMICR, [id number]  Lab Results   Component Value Date/Time    VITD25 41.8 10/20/2022 09:02 AM        ASSESSMENT/PLAN:  1. Age-related osteoporosis without current pathological fracture  Counseled patient about osteoporosis diagnosis, treatment options, medications, complications if untreated, fracture risk, medication side effects, benefits, efficacy, drug holiday therapy, secondary causes of osteoporosis, calcium intake calculator, calcium recommendations. She wants to repeat DEXA and then decide on treatment. She understands risk of fracture. 10/21/2021  DEXA 10/21//2021  Worst T score -2.7 her right femoral neck  Takes calcium with vitamin K and vitamin D3- 2 tablets daily (in 3 tablets 1000 IU vitamin D3, calcium 905 mg). Continue 2 tablets. PTH 17  Phosphorus 3.4  25 hydroxy vitamin D 41.8  Calcium 9.5  Albumin 4.4  24 hour urine calcium 223  24 hour urine sodium 103  24 hour urine cortisol 27  Creatinine clearance 82  Serum protein electrophoresis: Comment: No monoclonality detected. - Vitamin D 25 Hydroxy; Future  - Comprehensive Metabolic Panel; Future  Patient has some digestive problems    Reviewed and/or ordered clinical lab results Yes  Reviewed and/or ordered radiology tests Yes   Reviewed and/or ordered other diagnostic tests No  Discussed test results with performing physician No  Independently reviewed image, tracing, or specimen No  Made a decision to obtain old records No  Reviewed and summarized old records Yes   TSH 1.87    Calcium 9.4  25 hydroxy vitamin D43.2  DEXA 10/21//2021  Worst T score -2.7 her right femoral neck    Obtained history from other than patient No    Latha Travon was counseled regarding symptoms of osteoporosis diagnosis, course and complications of disease if inadequately treated, side effects of medications, diagnosis, treatment options, and prognosis, risks, benefits, complications, and alternatives of treatment, labs, imaging and other studies and treatment targets and goals. She understands instructions and counseling.     Total time I spent for this encounter gathering history, performing physical exam, coordinating care, counseling, and documenting in the chart - 30 minutes    Return in about 9 years (around 1/23/2032) for osteoporosis.     Electronically signed by Cresencio Ormond, MD on 1/23/2023 at 3:37 PM

## 2023-03-01 ENCOUNTER — OFFICE VISIT (OUTPATIENT)
Dept: PRIMARY CARE CLINIC | Age: 68
End: 2023-03-01
Payer: MEDICARE

## 2023-03-01 VITALS
BODY MASS INDEX: 20.43 KG/M2 | TEMPERATURE: 97.9 F | OXYGEN SATURATION: 99 % | SYSTOLIC BLOOD PRESSURE: 114 MMHG | RESPIRATION RATE: 14 BRPM | HEART RATE: 75 BPM | DIASTOLIC BLOOD PRESSURE: 70 MMHG | WEIGHT: 119 LBS

## 2023-03-01 DIAGNOSIS — J02.9 SORETHROAT: Primary | ICD-10-CM

## 2023-03-01 DIAGNOSIS — L98.9 SKIN LESION: ICD-10-CM

## 2023-03-01 PROCEDURE — G8420 CALC BMI NORM PARAMETERS: HCPCS | Performed by: STUDENT IN AN ORGANIZED HEALTH CARE EDUCATION/TRAINING PROGRAM

## 2023-03-01 PROCEDURE — 1090F PRES/ABSN URINE INCON ASSESS: CPT | Performed by: STUDENT IN AN ORGANIZED HEALTH CARE EDUCATION/TRAINING PROGRAM

## 2023-03-01 PROCEDURE — G8399 PT W/DXA RESULTS DOCUMENT: HCPCS | Performed by: STUDENT IN AN ORGANIZED HEALTH CARE EDUCATION/TRAINING PROGRAM

## 2023-03-01 PROCEDURE — 3017F COLORECTAL CA SCREEN DOC REV: CPT | Performed by: STUDENT IN AN ORGANIZED HEALTH CARE EDUCATION/TRAINING PROGRAM

## 2023-03-01 PROCEDURE — G8484 FLU IMMUNIZE NO ADMIN: HCPCS | Performed by: STUDENT IN AN ORGANIZED HEALTH CARE EDUCATION/TRAINING PROGRAM

## 2023-03-01 PROCEDURE — G8427 DOCREV CUR MEDS BY ELIG CLIN: HCPCS | Performed by: STUDENT IN AN ORGANIZED HEALTH CARE EDUCATION/TRAINING PROGRAM

## 2023-03-01 PROCEDURE — 99213 OFFICE O/P EST LOW 20 MIN: CPT | Performed by: STUDENT IN AN ORGANIZED HEALTH CARE EDUCATION/TRAINING PROGRAM

## 2023-03-01 PROCEDURE — 1123F ACP DISCUSS/DSCN MKR DOCD: CPT | Performed by: STUDENT IN AN ORGANIZED HEALTH CARE EDUCATION/TRAINING PROGRAM

## 2023-03-01 PROCEDURE — 1036F TOBACCO NON-USER: CPT | Performed by: STUDENT IN AN ORGANIZED HEALTH CARE EDUCATION/TRAINING PROGRAM

## 2023-03-01 SDOH — ECONOMIC STABILITY: FOOD INSECURITY: WITHIN THE PAST 12 MONTHS, YOU WORRIED THAT YOUR FOOD WOULD RUN OUT BEFORE YOU GOT MONEY TO BUY MORE.: NEVER TRUE

## 2023-03-01 SDOH — ECONOMIC STABILITY: FOOD INSECURITY: WITHIN THE PAST 12 MONTHS, THE FOOD YOU BOUGHT JUST DIDN'T LAST AND YOU DIDN'T HAVE MONEY TO GET MORE.: NEVER TRUE

## 2023-03-01 SDOH — ECONOMIC STABILITY: HOUSING INSECURITY
IN THE LAST 12 MONTHS, WAS THERE A TIME WHEN YOU DID NOT HAVE A STEADY PLACE TO SLEEP OR SLEPT IN A SHELTER (INCLUDING NOW)?: NO

## 2023-03-01 SDOH — ECONOMIC STABILITY: INCOME INSECURITY: HOW HARD IS IT FOR YOU TO PAY FOR THE VERY BASICS LIKE FOOD, HOUSING, MEDICAL CARE, AND HEATING?: NOT HARD AT ALL

## 2023-03-01 ASSESSMENT — PATIENT HEALTH QUESTIONNAIRE - PHQ9
SUM OF ALL RESPONSES TO PHQ QUESTIONS 1-9: 0
SUM OF ALL RESPONSES TO PHQ9 QUESTIONS 1 & 2: 0
SUM OF ALL RESPONSES TO PHQ QUESTIONS 1-9: 0
SUM OF ALL RESPONSES TO PHQ QUESTIONS 1-9: 0
1. LITTLE INTEREST OR PLEASURE IN DOING THINGS: 0
2. FEELING DOWN, DEPRESSED OR HOPELESS: 0
SUM OF ALL RESPONSES TO PHQ QUESTIONS 1-9: 0

## 2023-03-01 NOTE — PROGRESS NOTES
Redd Napoles (:  1955) is a 74 y.o. female,Established patient, here for evaluation of the following chief complaint(s):  Pharyngitis (Patient had soar throat since 10 days finished antibiotic ,still not felling good )         ASSESSMENT/PLAN:  1. Sorethroat  -     Culture, Throat  If positive will send in zpak, she had side-effects from augmentin    2. Skin lesion  Likely a solar lentigo  Will send to derm   -     Nichelle Still MD, Dermatology, [facility]    Return if symptoms worsen or fail to improve. Subjective   SUBJECTIVE/OBJECTIVE:  HPI    Sore throat, covid begative, went to [facility], daughter had strep, then she was dx on  with strep was on amox x 10 days, and still has sorethroat. Has a spot on chest that is larger than others      Review of Systems   All other systems reviewed and are negative. Objective   Physical Exam  Constitutional:       Appearance: Normal appearance. HENT:      Head: Normocephalic and atraumatic. Nose: Nose normal.      Mouth/Throat:      Mouth: Mucous membranes are moist.   Eyes:      Extraocular Movements: Extraocular movements intact. Cardiovascular:      Rate and Rhythm: Normal rate and regular rhythm. Heart sounds: Normal heart sounds. No murmur heard. No friction rub. No gallop. Pulmonary:      Effort: Pulmonary effort is normal.      Breath sounds: Normal breath sounds. No wheezing, rhonchi or rales. Skin:     General: Skin is warm. Comments: Brown irregularly shaped macule on left collarbone   Neurological:      General: No focal deficit present. Mental Status: She is alert. Psychiatric:         Mood and Affect: Mood normal.                An electronic signature was used to authenticate this note.     --Shellia Gitelman, MD

## 2023-03-02 ENCOUNTER — TELEPHONE (OUTPATIENT)
Dept: PRIMARY CARE CLINIC | Age: 68
End: 2023-03-02

## 2023-03-02 DIAGNOSIS — J02.9 PHARYNGITIS, UNSPECIFIED ETIOLOGY: Primary | ICD-10-CM

## 2023-03-02 RX ORDER — AZITHROMYCIN 250 MG/1
250 TABLET, FILM COATED ORAL SEE ADMIN INSTRUCTIONS
Qty: 6 TABLET | Refills: 0 | Status: SHIPPED | OUTPATIENT
Start: 2023-03-02 | End: 2023-03-07

## 2023-03-02 NOTE — TELEPHONE ENCOUNTER
Patient is concerned that if she does have strep that she will be going all this time without an antibiotic, she is hoping that one can be called in just in case she gets the results over the week end that way she already has it.  Please let the patient know what she needs to do

## 2023-03-03 LAB — THROAT CULTURE: NORMAL

## 2023-03-06 ENCOUNTER — TELEPHONE (OUTPATIENT)
Dept: PRIMARY CARE CLINIC | Age: 68
End: 2023-03-06

## 2023-03-06 DIAGNOSIS — R77.8 ABNORMAL SPEP: Primary | ICD-10-CM

## 2023-03-09 ENCOUNTER — TELEPHONE (OUTPATIENT)
Dept: PRIMARY CARE CLINIC | Age: 68
End: 2023-03-09

## 2023-03-09 NOTE — TELEPHONE ENCOUNTER
Spoke to [facility] Patient needs to see hemotology before MD can say if she should continue to be seen or not , labs were faxed to them as well.     Spoke to patient and advised her of what [facility] said

## 2023-03-09 NOTE — TELEPHONE ENCOUNTER
Can you please call the hematologist office that I referred her to and ask the clinical significance of this and if she needs to see them for this? Or do they recommend just repeating lab? Labs ordered by endocrine for hypercalcemia and osteoporosis    \"SPE shows an asymmetrical gamma. \"    Please fax all labs from 10/20

## 2023-09-26 ENCOUNTER — HOSPITAL ENCOUNTER (OUTPATIENT)
Dept: MAMMOGRAPHY | Age: 68
Discharge: HOME | End: 2023-09-26
Payer: MEDICARE

## 2023-09-26 DIAGNOSIS — Z12.31 VISIT FOR SCREENING MAMMOGRAM: ICD-10-CM

## 2023-09-26 PROCEDURE — 77063 BREAST TOMOSYNTHESIS BI: CPT

## 2023-09-28 ENCOUNTER — TELEPHONE (OUTPATIENT)
Dept: PRIMARY CARE CLINIC | Age: 68
End: 2023-09-28

## 2023-09-28 DIAGNOSIS — R92.8 ABNORMAL MAMMOGRAM OF LEFT BREAST: Primary | ICD-10-CM

## 2023-09-28 NOTE — TELEPHONE ENCOUNTER
Patient would like to know why this is ordered?   The US is the one recommended by radiologist  Test Ordered: ROSANNA ROCHA DIGITAL DIAGNOSTIC UNILATERAL LEFT [[id number]]   Code: 07526   ORD #: [id number]        Please follow up with patient : [phone]

## 2023-09-28 NOTE — TELEPHONE ENCOUNTER
Patient stated she needs US order only and to cancel the mammogram, if need for mammogram will do it later.

## 2024-01-03 ENCOUNTER — HOSPITAL ENCOUNTER (OUTPATIENT)
Dept: WOMENS IMAGING | Age: 69
Discharge: HOME | End: 2024-01-03
Payer: MEDICARE

## 2024-01-03 DIAGNOSIS — M81.0 AGE-RELATED OSTEOPOROSIS WITHOUT CURRENT PATHOLOGICAL FRACTURE: ICD-10-CM

## 2024-01-03 PROCEDURE — 77080 DXA BONE DENSITY AXIAL: CPT

## 2024-01-07 ENCOUNTER — TELEPHONE (OUTPATIENT)
Dept: ENDOCRINOLOGY | Age: 69
End: 2024-01-07

## 2024-12-20 ENCOUNTER — TRANSCRIBE ORDERS (OUTPATIENT)
Dept: ADMINISTRATIVE | Age: 69
End: 2024-12-20

## 2024-12-20 DIAGNOSIS — N63.21 MASS OF UPPER OUTER QUADRANT OF LEFT BREAST: Primary | ICD-10-CM

## 2025-01-16 ENCOUNTER — HOSPITAL ENCOUNTER (OUTPATIENT)
Dept: WOMENS IMAGING | Age: 70
Discharge: HOME | End: 2025-01-16
Payer: MEDICARE

## 2025-01-16 VITALS — WEIGHT: 120 LBS | BODY MASS INDEX: 21.26 KG/M2 | HEIGHT: 63 IN

## 2025-01-16 DIAGNOSIS — Z12.31 BREAST CANCER SCREENING BY MAMMOGRAM: ICD-10-CM

## 2025-01-16 PROCEDURE — 77063 BREAST TOMOSYNTHESIS BI: CPT
